# Patient Record
Sex: FEMALE | Race: WHITE | NOT HISPANIC OR LATINO | Employment: FULL TIME | URBAN - METROPOLITAN AREA
[De-identification: names, ages, dates, MRNs, and addresses within clinical notes are randomized per-mention and may not be internally consistent; named-entity substitution may affect disease eponyms.]

---

## 2017-01-04 ENCOUNTER — OFFICE VISIT (OUTPATIENT)
Dept: NEUROSURGERY | Facility: CLINIC | Age: 62
End: 2017-01-04

## 2017-01-04 VITALS
SYSTOLIC BLOOD PRESSURE: 143 MMHG | HEIGHT: 67 IN | BODY MASS INDEX: 23.54 KG/M2 | HEART RATE: 90 BPM | DIASTOLIC BLOOD PRESSURE: 91 MMHG | RESPIRATION RATE: 20 BRPM | WEIGHT: 150 LBS | OXYGEN SATURATION: 97 %

## 2017-01-04 DIAGNOSIS — Z98.890 POST-OPERATIVE STATE: ICD-10-CM

## 2017-01-04 DIAGNOSIS — M54.12 CERVICAL RADICULOPATHY: Primary | ICD-10-CM

## 2017-01-04 DIAGNOSIS — G44.221 CHRONIC TENSION-TYPE HEADACHE, INTRACTABLE: ICD-10-CM

## 2017-01-04 ASSESSMENT — PAIN SCALES - GENERAL: PAINLEVEL: SEVERE PAIN (6)
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
## 2017-01-04 NOTE — PATIENT INSTRUCTIONS
Call [phone] to schedule with Mercedes Fisher NP for your headaches    Call [facility] for you physical therapy consultation    If you have concerns, please call SANDHYA Damon at [phone]

## 2017-01-04 NOTE — MR AVS SNAPSHOT
After Visit Summary   1/4/2017    Kenya Jones    MRN: [id number]           Patient Information     Date Of Birth          1955        Visit Information        Provider Department      1/4/2017 2:00 PM Neda Perez PA-C [facility] Neurosurgery        Today's Diagnoses     Cervical radiculopathy    -  1     Post-operative state         Chronic headache           Care Instructions    Call [phone] to schedule with Mercedes Fisher NP for your headaches    Call [facility] for you physical therapy consultation    If you have concerns, please call SANDHYA Damon at [phone]          Follow-ups after your visit        Additional Services     Neurology Referral [2615]       Your provider has referred you to: [facility]: Neurology Clinic [facility] [phone]   [email or web address]  Mercedes Fisher NP for headaches    Reason for Referral: Consult    Please be aware that coverage of these services is subject to the terms and limitations of your health insurance plan.  Call member services at your health plan with any benefit or coverage questions.      Please bring the following with you to your appointment:    (1) Any X-Rays, CTs or MRIs which have been performed.  Contact the facility where they were done to arrange for  prior to your scheduled appointment.    (2) List of current medications  (3) This referral request   (4) Any documents/labs given to you for this referral            PT Evaluation and Treatment (External Referral) [1143]       Your provider has referred you to: [facility], [facility] [address] [facility] [address], [place] [address]  Phone: [phone]      Please be aware that coverage of these services is subject to the terms and limitations of your health insurance plan.  Call member services at your health plan with any benefit or coverage questions.      Treatment: Evaluation & Treatment  Special Instructions: None  Special  Programs: None  Modalities: As indicated  Equipment: None  Duration and Frequency: 3 of Visits - to teach home exercise program    Please bring the following to your appointment:  >>   Any x-rays, CTs or MRIs which have been performed.  Contact the facility where they were done to arrange for  prior to your scheduled appointment.  Any new CT, MRI or other procedures ordered by your specialist must be performed at a [place] facility or coordinated by your clinic's referral office.    >>   List of current medications   >>   This referral request   >>   Any documents/labs given to you for this referral      **Note to Provider** To refer patients to therapy outside of the Location list, change the order class to External Referral in the General section.                  Follow-up notes from your care team     Return in about 6 weeks (around 2/15/2017).      Your next 10 appointments already scheduled     Feb 03, 2017  9:30 AM   (Arrive by 9:15 AM)   New Patient Visit with AMELIA Duran CNP   [facility] Neurology ([facility])    [address] [place] [id number] [phone]            Feb 23, 2017  2:30 PM   (Arrive by 2:15 PM)   Return Visit with Ligia Winchesetr MD   [facility] Neurosurgery ([facility])    [address] [place] [id number] [phone]              Future tests that were ordered for you today     Open Future Orders        Priority Expected Expires Ordered    X-ray Cervical spine 2-3 vws Routine 2/13/2017 1/4/2018 1/4/2017            Who to contact     Please call your clinic at [phone] to:    Ask questions about your health    Make or cancel appointments    Discuss your medicines    Learn about your test results    Speak to your doctor   If you have compliments or concerns about an experience at your clinic, or if you wish to file a complaint, please contact  "[facility] Patient Relations at [phone] or email us at [email or web address]         Additional Information About Your Visit        [employer] Information     [employer] gives you secure access to your electronic health record. If you see a primary care provider, you can also send messages to your care team and make appointments. If you have questions, please call your primary care clinic.  If you do not have a primary care provider, please call [phone] and they will assist you.      [employer] is an electronic gateway that provides easy, online access to your medical records. With [employer], you can request a clinic appointment, read your test results, renew a prescription or communicate with your care team.     To access your existing account, please contact your [facility] Clinic or call [phone] for assistance.        Care EveryWhere ID     This is your Care EveryWhere ID. This could be used by other organizations to access your [facility] medical records  [id number]        Your Vitals Were     Pulse Respirations Height BMI (Body Mass Index) Pulse Oximetry Last Period    90 20 1.689 m (5' 6.5\") 23.85 kg/m2 97% 06/19/2006    Breastfeeding?                   No            Blood Pressure from Last 3 Encounters:   01/04/17 143/91   12/19/16 126/76   12/14/16 154/94    Weight from Last 3 Encounters:   01/04/17 68.04 kg (150 lb)   12/19/16 66.407 kg (146 lb 6.4 oz)   12/14/16 65.772 kg (145 lb)              We Performed the Following     Neurology Referral [9019]     PT Evaluation and Treatment (External Referral) [3732]          Today's Medication Changes          These changes are accurate as of: 1/4/17  3:10 PM.  If you have any questions, ask your nurse or doctor.               These medicines have changed or have updated prescriptions.        Dose/Directions    LEVOTHYROXINE SODIUM PO   Indication:  Underactive Thyroid   This may have changed:  " Another medication with the same name was removed. Continue taking this medication, and follow the directions you see here.   Changed by:  Neda Perez PA-C        Dose:  75 mcg   Take 75 mcg by mouth daily   Refills:  0       zolpidem 10 MG tablet   Commonly known as:  AMBIEN   This may have changed:    - how much to take  - how to take this  - when to take this  - reasons to take this  - additional instructions   Used for:  Insomnia, unspecified type        Patient has tried 5 mg dose and failed   Quantity:  39 tablet   Refills:  3                Primary Care Provider Office Phone # Fax #    Nahed Joe Rodas [phone]       [facility] [address] [place] [address]        Thank you!     Thank you for choosing [facility]  for your care. Our goal is always to provide you with excellent care. Hearing back from our patients is one way we can continue to improve our services. Please take a few minutes to complete the written survey that you may receive in the mail after your visit with us. Thank you!             Your Updated Medication List - Protect others around you: Learn how to safely use, store and throw away your medicines at www.disposemymeds.org.          This list is accurate as of: 1/4/17  3:10 PM.  Always use your most recent med list.                   Brand Name Dispense Instructions for use    AMITIZA PO      Take by mouth every 48 hours       benzocaine-menthol 6-10 MG lozenge    CHLORASEPTIC    84 lozenge    Place 1-2 lozenges inside cheek every hour as needed for sore throat       calcium-vitamin D-vitamin k 500-100-40 chewable tablet    VIACTIV    100 tablet    Take 1 chew tab by mouth 2 times daily.       cyclobenzaprine 10 MG tablet    FLEXERIL    90 tablet    Take 1 tablet (10 mg) by mouth 3 times daily as needed for muscle spasms       doxycycline monohydrate 25 MG/5ML Susr    VIBRAMYCIN     Take 100 mg by mouth 2 times daily        fluticasone 50 MCG/ACT spray    FLONASE    3 Bottle    Spray 1-2 sprays into both nostrils daily       HYDROcodone-acetaminophen 5-325 MG per tablet    NORCO    100 tablet    Take 1-2 tablets by mouth every 4 hours as needed for moderate to severe pain       hydrocortisone 25 MG Suppository    ANUSOL-HC    28 suppository    Place 1 suppository (25 mg) rectally 2 times daily as needed every morning and bedtime.       LEVOTHYROXINE SODIUM PO      Take 75 mcg by mouth daily       LINZESS PO      Take by mouth every morning (before breakfast)       RABEprazole 20 MG EC tablet    ACIPHEX    180 tablet    Take 1 tablet (20 mg) by mouth 2 times daily       rizatriptan 10 MG ODT tab    MAXALT-MLT    24 tablet    Take 1 tablet (10 mg) by mouth at onset of headache for migraine MAY REPEAT IN 2 HOURS IF NEEDED       senna-docusate 8.6-50 MG per tablet    SENOKOT-S;PERICOLACE    100 tablet    Take 1-2 tablets by mouth 2 times daily       vitamin B complex with vitamin C Tabs tablet      Take 1 tablet by mouth daily       vitamin D 2000 UNITS tablet      Take 2,000 Units by mouth daily       zolpidem 10 MG tablet    AMBIEN    39 tablet    Patient has tried 5 mg dose and failed

## 2017-01-04 NOTE — NURSING NOTE
Chief Complaint   Patient presents with     RECHECK     UMP- CERVICAL DISKECTOMY, 6 WEEKS F/U
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
## 2017-01-17 ENCOUNTER — PRE VISIT (OUTPATIENT)
Dept: NEUROLOGY | Facility: CLINIC | Age: 62
End: 2017-01-17

## 2017-01-17 NOTE — TELEPHONE ENCOUNTER
1.  Date/reason for appt: 2/3/17 - migraines  2.  Referring provider: [facility] Neurosurg Neda Perez  3.  Call to patient (Yes / No - short description): no, recs/ img in epic  4.  Previous care at:   - [facility] Neurosurgery    - [facility] Neurology    - [facility] Neurology (scanned in [facility])   - [facility]

## 2017-02-23 ENCOUNTER — OFFICE VISIT (OUTPATIENT)
Dept: NEUROSURGERY | Facility: CLINIC | Age: 62
End: 2017-02-23

## 2017-02-23 VITALS — HEIGHT: 67 IN | HEART RATE: 82 BPM | DIASTOLIC BLOOD PRESSURE: 90 MMHG | SYSTOLIC BLOOD PRESSURE: 143 MMHG

## 2017-02-23 DIAGNOSIS — Z98.1 ARTHRODESIS STATUS: Primary | ICD-10-CM

## 2017-02-23 ASSESSMENT — PAIN SCALES - GENERAL: PAINLEVEL: MILD PAIN (3)

## 2017-02-23 NOTE — LETTER
2017       RE: Shirley Jones  [address]   [place] [address]     Dear Colleague,    Thank you for referring your patient, Shirley Jones, to the [facility] NEUROSURGERY at [facility]. Please see a copy of my visit note below.    Please see dictated note #[id number].    HISTORY OF PRESENT ILLNESS:  Ms. Jones is a 61-year-old female seen in Neurosurgery Clinic today for a 3-month followup of a C5-6 ACDF.  The operation was performed on 2016 by Dr. Sigala.  Ms. Jones reports that she has been overall doing very well in the postoperative period.  She has been working with PT, and this improved her neck mobility and reduce the amount of muscle spasm she has had since the operation.  Her walking is fine.  She has had no swallowing issues.  Overall, she is happy with the results and is optimistic for continued resolution of symptoms in the future.      PHYSICAL EXAMINATION:    GENERAL:  Middle-aged female in no acute distress sitting in the exam chair.   INCISION:  A well-healed right anterior neck incision.      IMAGING:  AP and lateral x-rays of the cervical spine cord to 2017 were reviewed.  They demonstrate postsurgical hardware is in place and intact with no evidence of defect.      ASSESSMENT:  Ms. Jones is a 61-year-old female 3 months out from C4-C5 ACDF.  Overall, she is doing well.  We discussed that she may resume limited use of NSAIDs at this time.  We would like to see her back in clinic in 9 months.      PLAN:  Follow up in 9 months with flex-ex films of the C-spine prior.      PALU SIGALA MD       As dictated by MARILIA SEGURA MD, PHD, PGY1 neurosurgery resident.       D: 2017 05:32   T: 2017 07:12   MT: PHYLLIS      Name:     SHIRLEY JONES   MRN:      [id number]        Account:      [id number]   :      1955           Service Date: 2017      Document: [id number]

## 2017-02-23 NOTE — MR AVS SNAPSHOT
After Visit Summary   2/23/2017    Kenya Jones    MRN: [id number]           Patient Information     Date Of Birth          1955        Visit Information        Provider Department      2/23/2017 2:30 PM Ligia Winchester MD [facility] Neurosurgery        Today's Diagnoses     Arthrodesis status    -  1       Follow-ups after your visit        Follow-up notes from your care team     Return in about 9 months (around 11/23/2017).      Your next 10 appointments already scheduled     Nov 30, 2017 12:45 PM CST   XR CERVICAL SPINE FLEX/EXT 2/3 VIEWS with UCXR1   [facility] ([facility] [place])    [address]  [address] [facility] [address] [phone]           Please bring a list of your current medicines to your exam. (Include vitamins, minerals and over-thecounter medicines.) Leave your valuables at home.  Tell your doctor if there is a chance you may be pregnant.  You do not need to do anything special for this exam.            Nov 30, 2017  1:00 PM CST   (Arrive by 12:45 PM)   Return Visit with Ligia Winchester MD   [facility] Neurosurgery ([facility] [place])    [address]  3rd Floor  [facility] [address] [phone]              Who to contact     Please call your clinic at [phone] to:    Ask questions about your health    Make or cancel appointments    Discuss your medicines    Learn about your test results    Speak to your doctor   If you have compliments or concerns about an experience at your clinic, or if you wish to file a complaint, please contact [facility] Patient Relations at [phone] or email us at [email or web address]         Additional Information About Your Visit        MyChart Information     [employer] gives you secure access to your electronic health record. If you see a primary care provider, you can also send messages to your care team and make  "appointments. If you have questions, please call your primary care clinic.  If you do not have a primary care provider, please call [phone] and they will assist you.      [facility] is an electronic gateway that provides easy, online access to your medical records. With [facility], you can request a clinic appointment, read your test results, renew a prescription or communicate with your care team.     To access your existing account, please contact your [facility] or call [phone] for assistance.        Care EveryWhere ID     This is your Care EveryWhere ID. This could be used by other organizations to access your [place] medical records  [id number]        Your Vitals Were     Pulse Height Last Period             82 1.689 m (5' 6.5\") 06/19/2006          Blood Pressure from Last 3 Encounters:   No data found for BP    Weight from Last 3 Encounters:   No data found for Wt                 Today's Medication Changes          These changes are accurate as of: 2/23/17 11:59 PM.  If you have any questions, ask your nurse or doctor.               These medicines have changed or have updated prescriptions.        Dose/Directions    zolpidem 10 MG tablet   Commonly known as:  AMBIEN   This may have changed:    - how much to take  - how to take this  - when to take this  - reasons to take this  - additional instructions   Used for:  Insomnia, unspecified type        Patient has tried 5 mg dose and failed   Quantity:  39 tablet   Refills:  3                Primary Care Provider Office Phone # Fax #    Nahed Rodas [phone]       [facility] [address] [place] [address]        Thank you!     Thank you for choosing [facility]  for your care. Our goal is always to provide you with excellent care. Hearing back from our patients is one way we can continue to improve our services. Please take a few minutes to complete the written survey " that you may receive in the mail after your visit with us. Thank you!             Your Updated Medication List - Protect others around you: Learn how to safely use, store and throw away your medicines at www.disposemymeds.org.          This list is accurate as of: 2/23/17 11:59 PM.  Always use your most recent med list.                   Brand Name Dispense Instructions for use    AMITIZA PO      Take by mouth every 48 hours       benzocaine-menthol 6-10 MG lozenge    CHLORASEPTIC    84 lozenge    Place 1-2 lozenges inside cheek every hour as needed for sore throat       calcium-vitamin D-vitamin k 500-100-40 chewable tablet    VIACTIV    100 tablet    Take 1 chew tab by mouth 2 times daily.       cyclobenzaprine 10 MG tablet    FLEXERIL    90 tablet    Take 1 tablet (10 mg) by mouth 3 times daily as needed for muscle spasms       doxycycline monohydrate 25 MG/5ML Susr    VIBRAMYCIN     Take 100 mg by mouth 2 times daily       fluticasone 50 MCG/ACT spray    FLONASE    3 Bottle    Spray 1-2 sprays into both nostrils daily       HYDROcodone-acetaminophen 5-325 MG per tablet    NORCO    100 tablet    Take 1-2 tablets by mouth every 4 hours as needed for moderate to severe pain       hydrocortisone 25 MG Suppository    ANUSOL-HC    28 suppository    Place 1 suppository (25 mg) rectally 2 times daily as needed every morning and bedtime.       LEVOTHYROXINE SODIUM PO      Take 75 mcg by mouth daily       LINZESS PO      Take by mouth every morning (before breakfast)       rizatriptan 10 MG ODT tab    MAXALT-MLT    24 tablet    Take 1 tablet (10 mg) by mouth at onset of headache for migraine MAY REPEAT IN 2 HOURS IF NEEDED       senna-docusate 8.6-50 MG per tablet    SENOKOT-S;PERICOLACE    100 tablet    Take 1-2 tablets by mouth 2 times daily       vitamin B complex with vitamin C Tabs tablet      Take 1 tablet by mouth daily       vitamin D 2000 UNITS tablet      Take 2,000 Units by mouth daily       zolpidem 10 MG  tablet    AMBIEN    39 tablet    Patient has tried 5 mg dose and failed

## 2017-02-24 NOTE — PROGRESS NOTES
HISTORY OF PRESENT ILLNESS:  Ms. Jones is a 61-year-old female seen in Neurosurgery Clinic today for a 3-month followup of a C5-6 ACDF.  The operation was performed on 2016 by Dr. Sigala.  Ms. Jones reports that she has been overall doing very well in the postoperative period.  She has been working with PT, and this improved her neck mobility and reduce the amount of muscle spasm she has had since the operation.  Her walking is fine.  She has had no swallowing issues.  Overall, she is happy with the results and is optimistic for continued resolution of symptoms in the future.      PHYSICAL EXAMINATION:    GENERAL:  Middle-aged female in no acute distress sitting in the exam chair.   INCISION:  A well-healed right anterior neck incision.      IMAGING:  AP and lateral x-rays of the cervical spine cord to 2017 were reviewed.  They demonstrate postsurgical hardware is in place and intact with no evidence of defect.      ASSESSMENT:  Ms. Jones is a 61-year-old female 3 months out from C4-C5 ACDF.  Overall, she is doing well.  We discussed that she may resume limited use of NSAIDs at this time.  We would like to see her back in clinic in 9 months.      PLAN:  Follow up in 9 months with flex-ex films of the C-spine prior.          Addendum:  I have seen this patient and agree with this note. SYLVIA SIGALA MD       As dictated by MARILIA SEGURA MD, PHD, PGY1 neurosurgery resident.             D: 2017 05:32   T: 2017 07:12   MT: PHYLLIS      Name:     SHIRLEY JONES   MRN:      [id number]        Account:      [id number]   :      1955           Service Date: 2017      Document: [id number]

## 2017-03-14 DIAGNOSIS — G47.00 INSOMNIA, UNSPECIFIED TYPE: ICD-10-CM

## 2017-03-14 RX ORDER — ZOLPIDEM TARTRATE 10 MG/1
TABLET ORAL
Qty: 39 TABLET | Refills: 3 | Status: SHIPPED | OUTPATIENT
Start: 2017-03-14 | End: 2018-12-12

## 2017-03-14 NOTE — TELEPHONE ENCOUNTER
Call from pt requesting a refill for Ambien. States she has run out and has not been sleeping.    Ambien      Last Written Prescription Date:  8/2/16  Last Fill Quantity: 39,   # refills: 3  Last Office Visit with [facility], [facility] or [facility] prescribing provider: 12/19/16  Future Office visit:       Routing refill request to provider for review/approval because:  Drug not on the [facility], [facility] or [facility] refill protocol or controlled substance

## 2017-05-01 DIAGNOSIS — M54.42 ACUTE BILATERAL LOW BACK PAIN WITH LEFT-SIDED SCIATICA: Primary | ICD-10-CM

## 2017-05-01 RX ORDER — CYCLOBENZAPRINE HCL 10 MG
10 TABLET ORAL 3 TIMES DAILY PRN
Qty: 90 TABLET | Refills: 8 | Status: SHIPPED | OUTPATIENT
Start: 2017-05-01 | End: 2019-04-05

## 2017-05-01 NOTE — TELEPHONE ENCOUNTER
cyclobenzaprine      Last Written Prescription Date:  11/20/15  Last Fill Quantity: 90,   # refills: 8  Last Office Visit with [facility], [facility] or [facility] prescribing provider: 11/20/15  Future Office visit:

## 2017-06-14 DIAGNOSIS — E03.9 HYPOTHYROIDISM, UNSPECIFIED TYPE: Primary | ICD-10-CM

## 2017-06-14 RX ORDER — LEVOTHYROXINE SODIUM 75 UG/1
75 TABLET ORAL DAILY
Qty: 90 TABLET | Refills: 3 | Status: SHIPPED | OUTPATIENT
Start: 2017-06-14 | End: 2020-12-14

## 2017-06-14 NOTE — TELEPHONE ENCOUNTER
Pt calls, requesting refill of levothyroxine. States Acosta Gillespie was previous prescriber, but MD has left [facility] and was told last that since she is stable she didn't need to continue to return. Verified dosing with pt.    Pt reports she sees Sergio and considers him her PCP.    Levothyroxine 75 mcg daily    Last Written Prescription Date: N/A  Last Quantity: N/A, # refills: N/A  Last Office Visit with [facility], [facility] or [facility] prescribing provider: 12/19/16        TSH   Date Value Ref Range Status   12/20/2016 3.38 0.40 - 4.00 mU/L Final     Routing refill request to provider for review/approval because:  Medication is reported/historical    Please advise, thanks.

## 2017-07-10 ENCOUNTER — TELEPHONE (OUTPATIENT)
Dept: OBGYN | Facility: CLINIC | Age: 62
End: 2017-07-10

## 2017-08-04 ENCOUNTER — OFFICE VISIT (OUTPATIENT)
Dept: INTERNAL MEDICINE | Facility: CLINIC | Age: 62
End: 2017-08-04
Payer: COMMERCIAL

## 2017-08-04 VITALS
SYSTOLIC BLOOD PRESSURE: 110 MMHG | OXYGEN SATURATION: 98 % | WEIGHT: 148.2 LBS | HEART RATE: 91 BPM | BODY MASS INDEX: 23.26 KG/M2 | TEMPERATURE: 98.4 F | HEIGHT: 67 IN | DIASTOLIC BLOOD PRESSURE: 62 MMHG

## 2017-08-04 DIAGNOSIS — H91.90 DECREASED HEARING, UNSPECIFIED LATERALITY: ICD-10-CM

## 2017-08-04 DIAGNOSIS — M25.50 ARTHRALGIA, UNSPECIFIED JOINT: ICD-10-CM

## 2017-08-04 DIAGNOSIS — E03.8 OTHER SPECIFIED HYPOTHYROIDISM: Primary | ICD-10-CM

## 2017-08-04 DIAGNOSIS — Z98.1 S/P SPINAL FUSION: ICD-10-CM

## 2017-08-04 DIAGNOSIS — G70.00 OCULAR MYASTHENIA GRAVIS (H): ICD-10-CM

## 2017-08-04 DIAGNOSIS — Z12.31 VISIT FOR SCREENING MAMMOGRAM: ICD-10-CM

## 2017-08-04 LAB
CRP SERPL-MCNC: <2.9 MG/L (ref 0–8)
ERYTHROCYTE [SEDIMENTATION RATE] IN BLOOD BY WESTERGREN METHOD: 8 MM/H (ref 0–30)

## 2017-08-04 PROCEDURE — 36415 COLL VENOUS BLD VENIPUNCTURE: CPT | Performed by: INTERNAL MEDICINE

## 2017-08-04 PROCEDURE — 86200 CCP ANTIBODY: CPT | Performed by: INTERNAL MEDICINE

## 2017-08-04 PROCEDURE — 84443 ASSAY THYROID STIM HORMONE: CPT | Performed by: INTERNAL MEDICINE

## 2017-08-04 PROCEDURE — 99214 OFFICE O/P EST MOD 30 MIN: CPT | Performed by: INTERNAL MEDICINE

## 2017-08-04 PROCEDURE — 85652 RBC SED RATE AUTOMATED: CPT | Performed by: INTERNAL MEDICINE

## 2017-08-04 PROCEDURE — 86431 RHEUMATOID FACTOR QUANT: CPT | Performed by: INTERNAL MEDICINE

## 2017-08-04 PROCEDURE — 86140 C-REACTIVE PROTEIN: CPT | Performed by: INTERNAL MEDICINE

## 2017-08-04 NOTE — PROGRESS NOTES
"  SUBJECTIVE:                                                    Kenya Jones is a 62 year old female who presents to clinic today for the following health issues:    Fatigue.  She felt fatigued in June, and then the first week in July she improved.  This occurs regularly for her.   She feels as if the humidity worsens her joint pain.   She is a teacher.    Right hip pain and 2 digits on hand with pain.  She has had some swelling of her hand joints.  Her mother had rheumatoid arthritis.     Hypothyroidism. Weight has been stable. Skin changes- more dry. Eyelashes have fallen out.     Migraines.  She had a cervical spinal fusion in November, which has helped decrease her migraines.  She has had 2 since November.     Insomnia.  She uses ambien for sleep.     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    Reviewed and updated as needed this visit by clinical staff  Tobacco  Allergies  Meds  Med Hx  Surg Hx  Fam Hx  Soc Hx      Reviewed and updated as needed this visit by Provider         ROS:  C: NEGATIVE for fever, chills, change in weight  ENT: decreased hearing  R: NEGATIVE for significant cough or SOB  CV: NEGATIVE for chest pain, palpitations or peripheral edema    OBJECTIVE:     /62 (BP Location: Left arm, Patient Position: Sitting, Cuff Size: Adult Regular)  Pulse 91  Temp 98.4  F (36.9  C) (Oral)  Ht 5' 6.5\" (1.689 m)  Wt 148 lb 3.2 oz (67.2 kg)  LMP 06/19/2006  SpO2 98%  Breastfeeding? No  BMI 23.56 kg/m2  Body mass index is 23.56 kg/(m^2).  GENERAL: healthy, alert and no distress  ENT: ear canals patent bilaterally; tympanic membrane pearly gray bilaterally; thyroid without thyromegaly appreciated bilaterally  RESP: lungs clear to auscultation - no rales, rhonchi or wheezes  CV: regular rate and rhythm, normal S1 S2, no S3 or S4, no murmur, click or rub, no peripheral edema and peripheral pulses strong  MSK: " mild synovitis of hand joints appreciated bilaterally        ASSESSMENT/PLAN:       (E03.8) Other specified hypothyroidism  (primary encounter diagnosis)  Comment:   Plan: TSH with free T4 reflex            (Z98.1) S/P spinal fusion  Comment:   Plan: has improved migraines    (M25.50) Arthralgia, unspecified joint  Comment:   Plan: TSH with free T4 reflex, CBC with platelets         differential, Cyclic Citrullinated Peptide         Antibody IgG, Rheumatoid factor, Erythrocyte         sedimentation rate auto, CRP, inflammation            (H91.90) Decreased hearing, unspecified laterality  Comment:   Plan: OTOLARYNGOLOGY REFERRAL            (Z12.31) Visit for screening mammogram  Comment:   Plan: *MA Screening Digital Bilateral                Nahed Rodas MD  [facility]

## 2017-08-04 NOTE — NURSING NOTE
"Chief Complaint   Patient presents with     Musculoskeletal Problem     Pt has being having joint pain and gets tired easily. Pain at Rt hip and wants thyroid checked       Initial /62 (BP Location: Left arm, Patient Position: Sitting, Cuff Size: Adult Regular)  Pulse 91  Temp 98.4  F (36.9  C) (Oral)  Ht 5' 6.5\" (1.689 m)  Wt 148 lb 3.2 oz (67.2 kg)  LMP 06/19/2006  SpO2 98%  Breastfeeding? No  BMI 23.56 kg/m2 Estimated body mass index is 23.56 kg/(m^2) as calculated from the following:    Height as of this encounter: 5' 6.5\" (1.689 m).    Weight as of this encounter: 148 lb 3.2 oz (67.2 kg).  Medication Reconciliation: complete   Parag Andersen MA    "

## 2017-08-05 LAB — TSH SERPL DL<=0.005 MIU/L-ACNC: 2.39 MU/L (ref 0.4–4)

## 2017-08-07 LAB
CCP AB SER IA-ACNC: 1 U/ML
RHEUMATOID FACT SER NEPH-ACNC: <20 IU/ML (ref 0–20)

## 2017-08-10 ENCOUNTER — MYC MEDICAL ADVICE (OUTPATIENT)
Dept: INTERNAL MEDICINE | Facility: CLINIC | Age: 62
End: 2017-08-10

## 2017-08-17 ENCOUNTER — TRANSFERRED RECORDS (OUTPATIENT)
Dept: HEALTH INFORMATION MANAGEMENT | Facility: CLINIC | Age: 62
End: 2017-08-17

## 2017-08-30 NOTE — TELEPHONE ENCOUNTER
Contacted pt for follow up. Pt states that she did receive the Mychart from our office, but is looking into options right now. Pt will call back with an update once she decides how she wants to proceed.

## 2017-09-22 ENCOUNTER — HOSPITAL ENCOUNTER (OUTPATIENT)
Dept: MAMMOGRAPHY | Facility: CLINIC | Age: 62
Discharge: HOME | End: 2017-09-22
Attending: OBSTETRICS & GYNECOLOGY | Admitting: OBSTETRICS & GYNECOLOGY
Payer: COMMERCIAL

## 2017-09-22 DIAGNOSIS — Z12.31 VISIT FOR SCREENING MAMMOGRAM: ICD-10-CM

## 2017-09-22 PROCEDURE — 77063 BREAST TOMOSYNTHESIS BI: CPT

## 2017-11-30 ENCOUNTER — OFFICE VISIT (OUTPATIENT)
Dept: NEUROSURGERY | Facility: CLINIC | Age: 62
End: 2017-11-30

## 2017-11-30 VITALS — HEIGHT: 67 IN | WEIGHT: 153 LBS | BODY MASS INDEX: 24.01 KG/M2

## 2017-11-30 DIAGNOSIS — M54.12 CERVICAL RADICULOPATHY: Primary | ICD-10-CM

## 2017-11-30 ASSESSMENT — PAIN SCALES - GENERAL: PAINLEVEL: MILD PAIN (2)

## 2017-11-30 NOTE — MR AVS SNAPSHOT
"              After Visit Summary   11/30/2017    Kenya Jones    MRN: [id number]           Patient Information     Date Of Birth          1955        Visit Information        Provider Department      11/30/2017 1:00 PM Ligia Winchester MD [facility] Neurosurgery        Today's Diagnoses     Cervical radiculopathy    -  1       Follow-ups after your visit        Who to contact     Please call your clinic at [phone] to:    Ask questions about your health    Make or cancel appointments    Discuss your medicines    Learn about your test results    Speak to your doctor   If you have compliments or concerns about an experience at your clinic, or if you wish to file a complaint, please contact [facility] Patient Relations at [phone] or email us at [email or web address]         Additional Information About Your Visit        MyChart Information     [employer] gives you secure access to your electronic health record. If you see a primary care provider, you can also send messages to your care team and make appointments. If you have questions, please call your primary care clinic.  If you do not have a primary care provider, please call [phone] and they will assist you.      [employer] is an electronic gateway that provides easy, online access to your medical records. With [employer], you can request a clinic appointment, read your test results, renew a prescription or communicate with your care team.     To access your existing account, please contact your [facility] or call [phone] for assistance.        Care EveryWhere ID     This is your Care EveryWhere ID. This could be used by other organizations to access your [place] medical records  [id number]        Your Vitals Were     Height Last Period BMI (Body Mass Index)             1.689 m (5' 6.5\") 06/19/2006 24.32 kg/m2          Blood Pressure from Last 3 Encounters:   11/30/17 (P) " 124/86   08/04/17 110/62   02/23/17 143/90    Weight from Last 3 Encounters:   11/30/17 69.4 kg (153 lb)   08/04/17 67.2 kg (148 lb 3.2 oz)   01/04/17 68 kg (150 lb)              Today, you had the following     No orders found for display         Today's Medication Changes          These changes are accurate as of: 11/30/17 11:59 PM.  If you have any questions, ask your nurse or doctor.               These medicines have changed or have updated prescriptions.        Dose/Directions    zolpidem 10 MG tablet   Commonly known as:  AMBIEN   This may have changed:    - when to take this  - reasons to take this  - additional instructions   Used for:  Insomnia, unspecified type        Patient has tried 5 mg dose and failed   Quantity:  39 tablet   Refills:  3         Stop taking these medicines if you haven't already. Please contact your care team if you have questions.     calcium-vitamin D-vitamin k 500-100-40 chewable tablet   Commonly known as:  VIACTIV   Stopped by:  Ligia Winchester MD                    Primary Care Provider Office Phone # Fax #    [place] [phone]       [address] [address]        Equal Access to Services     [facility] Hospital[facility] : Hadii makenzie acosta hadkierano Soberna, waaxda luqadaha, qaybta kaalmada adeegyada, rita swenson. So [facility] [phone].    ATENCIÓN: Si habla español, tiene a mccullough disposición servicios gratuitos de asistencia lingüística. Llame al [phone].    We comply with applicable federal civil rights laws and [place] laws. We do not discriminate on the basis of race, color, national origin, age, disability, sex, sexual orientation, or gender identity.            Thank you!     Thank you for choosing [facility]  for your care. Our goal is always to provide you with excellent care. Hearing back from our patients is one way we can continue to improve our services. Please take a few minutes to  complete the written survey that you may receive in the mail after your visit with us. Thank you!             Your Updated Medication List - Protect others around you: Learn how to safely use, store and throw away your medicines at www.disposemymeds.org.          This list is accurate as of: 11/30/17 11:59 PM.  Always use your most recent med list.                   Brand Name Dispense Instructions for use Diagnosis    AMITIZA PO      Take by mouth every 48 hours        cyclobenzaprine 10 MG tablet    FLEXERIL    90 tablet    Take 1 tablet (10 mg) by mouth 3 times daily as needed for muscle spasms    Acute bilateral low back pain with left-sided sciatica       fluticasone 50 MCG/ACT spray    FLONASE    3 Bottle    Spray 1-2 sprays into both nostrils daily    Seasonal allergic rhinitis due to pollen       hydrocortisone 25 MG Suppository    ANUSOL-HC    28 suppository    Place 1 suppository (25 mg) rectally 2 times daily as needed every morning and bedtime.    External hemorrhoids       levothyroxine 75 MCG tablet    SYNTHROID/LEVOTHROID    90 tablet    Take 1 tablet (75 mcg) by mouth daily    Hypothyroidism, unspecified type       LINZESS PO      Take 290 mcg by mouth every morning (before breakfast)        vitamin B complex with vitamin C Tabs tablet      Take 1 tablet by mouth daily        vitamin D 2000 UNITS tablet      Take 1,000 Units by mouth daily        zolpidem 10 MG tablet    AMBIEN    39 tablet    Patient has tried 5 mg dose and failed    Insomnia, unspecified type

## 2017-11-30 NOTE — LETTER
11/30/2017       RE: Kenya Jones  [address] [place] [address]     Dear Colleague,    Thank you for referring your patient, Kenya Jones, to the [facility] NEUROSURGERY at [facility]. Please see a copy of my visit note below.    HISTORY OF PRESENT ILLNESS:  Ms. Jones is a 62-year-old female returning to Neurosurgery Clinic for her 1-year followup status post anterior C4-C5 diskectomy and fusion completed on 11/29/2016.  Postoperatively, the patient has continued to do very well and has had resolution of her radicular pains.  She does complain, however, of a new and worsening pain radiating down her right leg.  She describes pain in a right L5 radicular distribution.  This pain is worse at night.  She has attempted physical therapy for approximately the last 6 months but feels that the pain has continued to get worse.  She is continuing to want to treat this conservatively.  She denies any numbness or weakness.      PHYSICAL EXAMINATION:  The patient is alert and appropriately communicative.  Sensation and strength are intact in upper and lower extremities bilaterally.  The patient's incision is well-healed.      IMAGING:  The cervical flexion and extension x-rays dated 11/30/2017 were available for review.  These images demonstrate no movement across the fusion levels.      ASSESSMENT:     1.  Cervical radiculopathy, status post anterior C4-C5 diskectomy and fusion.     2.  Right L5 radiculopathy.      PLAN:   1.  The patient is discharged from clinic.   2.  The patient to follow up and work with her PCP regarding her right leg pain.  Additional imaging should be ordered by her PCP to complete the workup.      The patient is welcome to follow up in clinic p.r.n. for her ongoing lower extremity pain if the additional imaging demonstrates an appropriate surgical target.       Dictated by Tanner Burns MD, Resident       Addendum:  I have seen this patient  and agree with this note. AMP    PAUL WINCHESTER MD       As dictated by PEARL REHMAN MD            D: 2017 05:17   T: 2017 09:44   MT: PHYLLIS      Name:     SHIRLEY LÓPEZ   MRN:      [id number]        Account:      [id number]   :      1955           Service Date: 2017      Document: [id number]        Again, thank you for allowing me to participate in the care of your patient.      Sincerely,    Paul Winchester MD

## 2017-11-30 NOTE — NURSING NOTE
Chief Complaint   Patient presents with     RECHECK     F/U ARTHRODESIS STATUS      Heather Linares, [facility]  Endocrinology & Diabetes 3G

## 2017-12-06 NOTE — PROGRESS NOTES
HISTORY OF PRESENT ILLNESS:  Ms. Jones is a 62-year-old female returning to [facility] for her 1-year followup status post anterior C4-C5 diskectomy and fusion completed on 2016.  Postoperatively, the patient has continued to do very well and has had resolution of her radicular pains.  She does complain, however, of a new and worsening pain radiating down her right leg.  She describes pain in a right L5 radicular distribution.  This pain is worse at night.  She has attempted physical therapy for approximately the last 6 months but feels that the pain has continued to get worse.  She is continuing to want to treat this conservatively.  She denies any numbness or weakness.      PHYSICAL EXAMINATION:  The patient is alert and appropriately communicative.  Sensation and strength are intact in upper and lower extremities bilaterally.  The patient's incision is well-healed.      IMAGING:  The cervical flexion and extension x-rays dated 2017 were available for review.  These images demonstrate no movement across the fusion levels.      ASSESSMENT:     1.  Cervical radiculopathy, status post anterior C4-C5 diskectomy and fusion.     2.  Right L5 radiculopathy.      PLAN:   1.  The patient is discharged from clinic.   2.  The patient to follow up and work with her PCP regarding her right leg pain.  Additional imaging should be ordered by her PCP to complete the workup.      The patient is welcome to follow up in clinic p.r.n. for her ongoing lower extremity pain if the additional imaging demonstrates an appropriate surgical target.       Dictated by Pearl Burns MD, Resident       Addendum:  I have seen this patient and agree with this note. SYLVIA SIGALA MD       As dictated by PEARL BURNS MD            D: 2017 05:17   T: 2017 09:44   MT: PHYLLIS      Name:     SHIRLEY JONES   MRN:      [id number]        Account:      [id number]   :      1955           Service  Date: 11/30/2017      Document: [id number]

## 2018-01-20 ENCOUNTER — HEALTH MAINTENANCE LETTER (OUTPATIENT)
Age: 63
End: 2018-01-20

## 2018-01-29 ENCOUNTER — OFFICE VISIT (OUTPATIENT)
Dept: OBGYN | Facility: CLINIC | Age: 63
End: 2018-01-29
Payer: COMMERCIAL

## 2018-01-29 VITALS
DIASTOLIC BLOOD PRESSURE: 80 MMHG | WEIGHT: 150 LBS | SYSTOLIC BLOOD PRESSURE: 114 MMHG | BODY MASS INDEX: 23.85 KG/M2 | HEART RATE: 68 BPM

## 2018-01-29 DIAGNOSIS — Z01.419 ENCOUNTER FOR GYNECOLOGICAL EXAMINATION WITHOUT ABNORMAL FINDING: Primary | ICD-10-CM

## 2018-01-29 DIAGNOSIS — R10.2 PELVIC PAIN IN FEMALE: ICD-10-CM

## 2018-01-29 PROCEDURE — 87624 HPV HI-RISK TYP POOLED RSLT: CPT | Performed by: OBSTETRICS & GYNECOLOGY

## 2018-01-29 PROCEDURE — 88175 CYTOPATH C/V AUTO FLUID REDO: CPT | Performed by: OBSTETRICS & GYNECOLOGY

## 2018-01-29 PROCEDURE — 99396 PREV VISIT EST AGE 40-64: CPT | Performed by: OBSTETRICS & GYNECOLOGY

## 2018-01-29 NOTE — MR AVS SNAPSHOT
After Visit Summary   1/29/2018    Kenya Jones    MRN: [id number]           Patient Information     Date Of Birth          1955        Visit Information        Provider Department      1/29/2018 5:00 PM Duglas Hill MD [facility]        Today's Diagnoses     Encounter for gynecological examination without abnormal finding    -  1    Pelvic pain in female           Follow-ups after your visit        Your next 10 appointments already scheduled     Feb 23, 2018  1:15 PM CST   US PELVIC COMPLETE W TRANSVAGINAL with RIUS1   [facility] ([facility])    [address]  [address]  [facility] [address] [phone]           Please bring a list of your medicines (including vitamins, minerals and over-the-counter drugs). Also, tell your doctor about any allergies you may have. Wear comfortable clothes and leave your valuables at home.  Adults: Drink six 8-ounce glasses of fluid one hour before your exam. Do NOT empty your bladder.  If you need to empty your bladder before your exam, try to release only a little bit of urine. Then, drink another 8oz glass of fluid.  Children: Children who are potty trained should drink at least 4 cups (32 oz) of liquid 45 minutes to one hour prior to the exam. The child s bladder must be full in order to achieve a diagnostic exam. If your child is very uncomfortable or has an urgent need to pee, please notify a technologist; they will try to find out how much longer the wait may be and provide instructions to help relieve the pressure. Occasionally it is medically necessary to insert a urinary catheter to fill the bladder.  Please call the Imaging Department at your exam site with any questions.              Future tests that were ordered for you today     Open Future Orders        Priority Expected Expires Ordered    US Pelvic Complete w Transvaginal Routine  1/29/2019 1/29/2018            Who  to contact     If you have questions or need follow up information about today's clinic visit or your schedule please contact [facility] directly at [phone].  Normal or non-critical lab and imaging results will be communicated to you by Exhibiahart, letter or phone within 4 business days after the clinic has received the results. If you do not hear from us within 7 days, please contact the clinic through Exhibiahart or phone. If you have a critical or abnormal lab result, we will notify you by phone as soon as possible.  Submit refill requests through Flyzik or call your pharmacy and they will forward the refill request to us. Please allow 3 business days for your refill to be completed.          Additional Information About Your Visit        ExhibiaharWho is Undercover Spy Information     Flyzik gives you secure access to your electronic health record. If you see a primary care provider, you can also send messages to your care team and make appointments. If you have questions, please call your primary care clinic.  If you do not have a primary care provider, please call [phone] and they will assist you.        Care EveryWhere ID     This is your Care EveryWhere ID. This could be used by other organizations to access your [place] medical records  [id number]        Your Vitals Were     Pulse Last Period BMI (Body Mass Index)             68 06/23/2006 23.85 kg/m2          Blood Pressure from Last 3 Encounters:   01/29/18 114/80   11/30/17 (P) 124/86   08/04/17 110/62    Weight from Last 3 Encounters:   01/29/18 150 lb (68 kg)   11/30/17 153 lb (69.4 kg)   08/04/17 148 lb 3.2 oz (67.2 kg)              We Performed the Following     HPV High Risk Types DNA Cervical     Pap imaged thin layer diagnostic with HPV (select HPV order below)          Today's Medication Changes          These changes are accurate as of 1/29/18  6:05 PM.  If you have any questions, ask your nurse or doctor.               These medicines have  changed or have updated prescriptions.        Dose/Directions    zolpidem 10 MG tablet   Commonly known as:  AMBIEN   This may have changed:    - when to take this  - reasons to take this  - additional instructions   Used for:  Insomnia, unspecified type        Patient has tried 5 mg dose and failed   Quantity:  39 tablet   Refills:  3                Primary Care Provider Office Phone # Fax #    Duglas Hill [phone]       [address] [facility]  [address]  [facility] [address]        Equal Access to Services     ADRIANA ROPER : Hadii aad ku hadasho Soomaali, waaxda luqadaha, qaybta kaalmada adeegyada, waxay idiin hayaan adeeg kharadeclan laso . So [facility] [phone].    ATENCIÓN: Si habla español, tiene a mccullough disposición servicios gratuitos de asistencia lingüística. [facility] [phone].    We comply with applicable federal civil rights laws and [place] laws. We do not discriminate on the basis of race, color, national origin, age, disability, sex, sexual orientation, or gender identity.            Thank you!     Thank you for choosing [facility]  for your care. Our goal is always to provide you with excellent care. Hearing back from our patients is one way we can continue to improve our services. Please take a few minutes to complete the written survey that you may receive in the mail after your visit with us. Thank you!             Your Updated Medication List - Protect others around you: Learn how to safely use, store and throw away your medicines at www.disposemymeds.org.          This list is accurate as of 1/29/18  6:05 PM.  Always use your most recent med list.                   Brand Name Dispense Instructions for use Diagnosis    AMITIZA PO      Take by mouth every 48 hours        cyclobenzaprine 10 MG tablet    FLEXERIL    90 tablet    Take 1 tablet (10 mg) by mouth 3 times daily as needed for muscle spasms    Acute bilateral low back pain with left-sided sciatica        fluticasone 50 MCG/ACT spray    FLONASE    3 Bottle    Spray 1-2 sprays into both nostrils daily    Seasonal allergic rhinitis due to pollen       hydrocortisone 25 MG Suppository    ANUSOL-HC    28 suppository    Place 1 suppository (25 mg) rectally 2 times daily as needed every morning and bedtime.    External hemorrhoids       levothyroxine 75 MCG tablet    SYNTHROID/LEVOTHROID    90 tablet    Take 1 tablet (75 mcg) by mouth daily    Hypothyroidism, unspecified type       LINZESS PO      Take 290 mcg by mouth every morning (before breakfast)        vitamin B complex with vitamin C Tabs tablet      Take 1 tablet by mouth daily        vitamin D 2000 UNITS tablet      Take 1,000 Units by mouth daily        zolpidem 10 MG tablet    AMBIEN    39 tablet    Patient has tried 5 mg dose and failed    Insomnia, unspecified type

## 2018-01-29 NOTE — NURSING NOTE
HEALTH CARE MAINTENANCE:  ========================  Ever had an abnormal pap? Yes - date:   Menses are every NA days; lasting for NA days.  Flow is   NA.  Menstrual Pain? NA PMS symptoms? NA  Have you had a pneumonia shot? Not applicable  How many dairy products do you eat daily? 2  Have you had an eye exam in the past year? YES  Health Maintenance Reviewed:  Health Maintenance   Topic Date Due     ADVANCE DIRECTIVE PLANNING Q5 YRS  05/06/2012     INFLUENZA VACCINE (SYSTEM ASSIGNED)  09/01/2017     PAP Q1 YR DIAGNOSTIC  12/19/2017     HPV Q1 Year  12/19/2017     MAMMO Q1 YR  09/22/2018     TETANUS Q10 YR  12/01/2020     LIPID SCREEN Q5 YR FEMALE (SYSTEM ASSIGNED)  12/20/2021     COLONOSCOPY Q10 YR  08/17/2027     MIGRAINE ACTION PLAN  Completed     HEPATITIS C SCREENING  Completed       SAFETY:  =======  Do you exercise? YES       If yes, how many times per week? 4-5  Do you feel safe in your relationship(s)? Not Applicable  Do you have a gun in your home? N/A   Do you wear your seatbelt regularly? YES  Do you use sunscreen? NO    Are you fasting today? No    Last mammogram 09/22/17    Last pap 12/19/16    Skin check - spot on back    Nurse assisted visit.  Tammy Aaron MA.
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
## 2018-02-02 LAB
COPATH REPORT: NORMAL
PAP: NORMAL

## 2018-02-06 LAB
FINAL DIAGNOSIS: ABNORMAL
HPV HR 12 DNA CVX QL NAA+PROBE: POSITIVE
HPV16 DNA SPEC QL NAA+PROBE: NEGATIVE
HPV18 DNA SPEC QL NAA+PROBE: NEGATIVE
SPECIMEN DESCRIPTION: ABNORMAL
SPECIMEN SOURCE CVX/VAG CYTO: ABNORMAL

## 2018-02-23 ENCOUNTER — RADIANT APPOINTMENT (OUTPATIENT)
Dept: ULTRASOUND IMAGING | Facility: CLINIC | Age: 63
End: 2018-02-23
Attending: OBSTETRICS & GYNECOLOGY
Payer: COMMERCIAL

## 2018-02-23 DIAGNOSIS — R10.2 PELVIC PAIN IN FEMALE: ICD-10-CM

## 2018-02-23 PROCEDURE — 76830 TRANSVAGINAL US NON-OB: CPT | Performed by: OBSTETRICS & GYNECOLOGY

## 2018-02-23 PROCEDURE — 76856 US EXAM PELVIC COMPLETE: CPT | Performed by: OBSTETRICS & GYNECOLOGY

## 2018-03-28 DIAGNOSIS — J30.2 SEASONAL ALLERGIC RHINITIS: Primary | ICD-10-CM

## 2018-03-28 RX ORDER — FLUTICASONE PROPIONATE 50 MCG
1-2 SPRAY, SUSPENSION (ML) NASAL DAILY
Qty: 3 BOTTLE | Refills: 3 | Status: SHIPPED | OUTPATIENT
Start: 2018-03-28 | End: 2020-12-14

## 2018-03-28 NOTE — TELEPHONE ENCOUNTER
Fluticasone propionate      Last Written Prescription Date:  12/29/16  Last Fill Quantity: 3,   # refills: 3  Last Office Visit: 1/29/18  Future Office visit:

## 2018-03-28 NOTE — TELEPHONE ENCOUNTER
Prescription approved per [facility] Refill Protocol.  Harriet Mccarthy RN

## 2018-09-26 ENCOUNTER — HOSPITAL ENCOUNTER (OUTPATIENT)
Dept: MAMMOGRAPHY | Facility: CLINIC | Age: 63
Discharge: HOME | End: 2018-09-26
Attending: OBSTETRICS & GYNECOLOGY | Admitting: OBSTETRICS & GYNECOLOGY
Payer: COMMERCIAL

## 2018-09-26 DIAGNOSIS — Z12.39 BREAST CANCER SCREENING: ICD-10-CM

## 2018-09-26 PROCEDURE — 77063 BREAST TOMOSYNTHESIS BI: CPT

## 2018-11-07 ENCOUNTER — OFFICE VISIT (OUTPATIENT)
Dept: OBGYN | Facility: CLINIC | Age: 63
End: 2018-11-07
Payer: COMMERCIAL

## 2018-11-07 VITALS
DIASTOLIC BLOOD PRESSURE: 86 MMHG | SYSTOLIC BLOOD PRESSURE: 142 MMHG | HEART RATE: 68 BPM | WEIGHT: 149 LBS | BODY MASS INDEX: 23.69 KG/M2

## 2018-11-07 DIAGNOSIS — B97.7 HIGH RISK HPV INFECTION: Primary | ICD-10-CM

## 2018-11-07 PROCEDURE — 87624 HPV HI-RISK TYP POOLED RSLT: CPT | Performed by: OBSTETRICS & GYNECOLOGY

## 2018-11-07 PROCEDURE — 88175 CYTOPATH C/V AUTO FLUID REDO: CPT | Performed by: OBSTETRICS & GYNECOLOGY

## 2018-11-07 PROCEDURE — 57452 EXAM OF CERVIX W/SCOPE: CPT | Performed by: OBSTETRICS & GYNECOLOGY

## 2018-11-07 PROCEDURE — 88141 CYTOPATH C/V INTERPRET: CPT | Performed by: OBSTETRICS & GYNECOLOGY

## 2018-11-07 RX ORDER — FLUTICASONE PROPIONATE 50 MCG
SPRAY, SUSPENSION (ML) NASAL
COMMUNITY
End: 2024-06-03

## 2018-11-07 RX ORDER — LEVOTHYROXINE SODIUM 75 UG/1
75 TABLET ORAL
COMMUNITY
Start: 2018-06-29 | End: 2019-06-29

## 2018-11-07 RX ORDER — CHOLECALCIFEROL (VITAMIN D3) 50 MCG
1000 TABLET ORAL
COMMUNITY
Start: 2014-09-12

## 2018-11-07 RX ORDER — ZOLMITRIPTAN 5 MG/1
5 SPRAY NASAL
COMMUNITY
Start: 2017-09-22 | End: 2020-12-14

## 2018-11-07 RX ORDER — ZOLPIDEM TARTRATE 10 MG/1
10 TABLET ORAL
COMMUNITY
Start: 2018-07-12 | End: 2020-12-14

## 2018-11-07 RX ORDER — CYCLOBENZAPRINE HCL 10 MG
10 TABLET ORAL
COMMUNITY
Start: 2018-01-25

## 2018-11-07 RX ORDER — ZOLPIDEM TARTRATE 10 MG/1
10 TABLET ORAL
COMMUNITY
Start: 2018-01-25 | End: 2020-12-14

## 2018-11-07 NOTE — LETTER
November 14, 2018      Kenya Jones  [address] [place] [address]    Dear ,      This letter is in regards to your recent cervical cancer screening (PAP smear and HPV test) that was completed along with your colpsocopy.    Your Pap smear result was reported as LSIL - low grade squamous intraepthelial lesion This means that there were mildly abnormal cells found in the sample that we collected from your cervix. The vast majority of patients with this result do not have significant cervical abnormalities.     Your cervical sample was also tested for the presence of Human Papillomavirus (HPV). Your sample was positive for HPV. There are many types of HPV, but we test pap samples for the high risk types. HPV can be the cause of an abnormal Pap smear result. The high risk types of HPV can also be related to the potential development of cervical cancer if not monitored and/or treated appropriately.    Over time, your body can get rid of these abnormal cells, so Dr. Hill has recommended that you repeat your PAP smear in 6 months to 1 year.    If you have additional questions regarding this result, please call our registered nurse, Bina at [phone].    Please continue to be seen every year for an annual physical exam and other preventative tests.    Sincerely,      Duglas Hill MD/Lynette Nissen RN

## 2018-11-07 NOTE — MR AVS SNAPSHOT
After Visit Summary   11/7/2018    Kenya Jones    MRN: [id number]           Patient Information     Date Of Birth          1955        Visit Information        Provider Department      11/7/2018 5:30 PM [facility] MD [facility]        Today's Diagnoses     High risk HPV infection    -  1       Follow-ups after your visit        Who to contact     If you have questions or need follow up information about today's clinic visit or your schedule please contact [facility] directly at [phone].  Normal or non-critical lab and imaging results will be communicated to you by Contorionhart, letter or phone within 4 business days after the clinic has received the results. If you do not hear from us within 7 days, please contact the clinic through [employer] or phone. If you have a critical or abnormal lab result, we will notify you by phone as soon as possible.  Submit refill requests through SigFig or call your pharmacy and they will forward the refill request to us. Please allow 3 business days for your refill to be completed.          Additional Information About Your Visit        MyChart Information     SigFig gives you secure access to your electronic health record. If you see a primary care provider, you can also send messages to your care team and make appointments. If you have questions, please call your primary care clinic.  If you do not have a primary care provider, please call [phone] and they will assist you.        Care EveryWhere ID     This is your Care EveryWhere ID. This could be used by other organizations to access your [place] medical records  [id number]        Your Vitals Were     Pulse Last Period BMI (Body Mass Index)             68 06/23/2006 23.69 kg/m2          Blood Pressure from Last 3 Encounters:   11/07/18 142/86   01/29/18 114/80   11/30/17 (P) 124/86    Weight from Last 3 Encounters:   11/07/18 149 lb (67.6 kg)   01/29/18 150 lb (68  kg)   11/30/17 153 lb (69.4 kg)              We Performed the Following     HPV High Risk Types DNA Cervical     Pap imaged thin layer diagnostic with HPV (select HPV order below)          Today's Medication Changes          These changes are accurate as of 11/7/18  6:42 PM.  If you have any questions, ask your nurse or doctor.               These medicines have changed or have updated prescriptions.        Dose/Directions    * zolpidem 10 MG tablet   Commonly known as:  AMBIEN   This may have changed:    - when to take this  - reasons to take this  - additional instructions   Used for:  Insomnia, unspecified type        Patient has tried 5 mg dose and failed   Quantity:  39 tablet   Refills:  3       * zolpidem 10 MG tablet   Commonly known as:  AMBIEN   This may have changed:  Another medication with the same name was changed. Make sure you understand how and when to take each.        Dose:  10 mg   Take 10 mg by mouth   Refills:  0       * zolpidem 10 MG tablet   Commonly known as:  AMBIEN   This may have changed:  Another medication with the same name was changed. Make sure you understand how and when to take each.        Dose:  10 mg   Take 10 mg by mouth   Refills:  0       * Notice:  This list has 3 medication(s) that are the same as other medications prescribed for you. Read the directions carefully, and ask your doctor or other care provider to review them with you.             Primary Care Provider Office Phone # Fax #    Duglas Hill [phone]       [address] [address]        Equal Access to Services     [facility]: Hadii makenzie acosta hadasho Soberna, waaxda luqadaha, qaybta kaalmada adeegyada, rita swenson. So [facility] [phone].    ATENCIÓN: Si habla español, tiene a mccullough disposición servicios gratuitos de asistencia lingüística. Llame al [phone].    We comply with applicable federal civil rights laws and [place] laws. We  do not discriminate on the basis of race, color, national origin, age, disability, sex, sexual orientation, or gender identity.            Thank you!     Thank you for choosing [facility]  for your care. Our goal is always to provide you with excellent care. Hearing back from our patients is one way we can continue to improve our services. Please take a few minutes to complete the written survey that you may receive in the mail after your visit with us. Thank you!             Your Updated Medication List - Protect others around you: Learn how to safely use, store and throw away your medicines at www.disposemymeds.org.          This list is accurate as of 11/7/18  6:42 PM.  Always use your most recent med list.                   Brand Name Dispense Instructions for use Diagnosis    AMITIZA PO      Take by mouth every 48 hours        * cyclobenzaprine 10 MG tablet    FLEXERIL    90 tablet    Take 1 tablet (10 mg) by mouth 3 times daily as needed for muscle spasms    Acute bilateral low back pain with left-sided sciatica       * cyclobenzaprine 10 MG tablet    FLEXERIL     Take 10 mg by mouth        * fluticasone 50 MCG/ACT spray    FLONASE     Use two sprays in each nostril once daily.        * fluticasone 50 MCG/ACT spray    FLONASE    3 Bottle    Spray 1-2 sprays into both nostrils daily    Seasonal allergic rhinitis       hydrocortisone 25 MG Suppository    ANUSOL-HC    28 suppository    Place 1 suppository (25 mg) rectally 2 times daily as needed every morning and bedtime.    External hemorrhoids       * levothyroxine 75 MCG tablet    SYNTHROID/LEVOTHROID    90 tablet    Take 1 tablet (75 mcg) by mouth daily    Hypothyroidism, unspecified type       * levothyroxine 75 MCG tablet    SYNTHROID/LEVOTHROID     Take 75 mcg by mouth        LINZESS PO      Take 290 mcg by mouth every morning (before breakfast)        vitamin B complex with vitamin C Tabs tablet      Take 1 tablet by mouth daily        vitamin  D 2000 units tablet      Take 1,000 Units by mouth        ZOLMitriptan 5 MG Soln      Spray 5 mg in nostril        * zolpidem 10 MG tablet    AMBIEN    39 tablet    Patient has tried 5 mg dose and failed    Insomnia, unspecified type       * zolpidem 10 MG tablet    AMBIEN     Take 10 mg by mouth        * zolpidem 10 MG tablet    AMBIEN     Take 10 mg by mouth        * Notice:  This list has 9 medication(s) that are the same as other medications prescribed for you. Read the directions carefully, and ask your doctor or other care provider to review them with you.

## 2018-11-07 NOTE — NURSING NOTE
"Chief Complaint   Patient presents with     Repeat Pap Smear     last pap 18 NIL - HPV HR + - pap 16 NIL - HPV negative       Initial /86 (BP Location: Right arm, Patient Position: Chair, Cuff Size: Adult Regular)  Pulse 68  Wt 149 lb (67.6 kg)  LMP 2006  BMI 23.69 kg/m2 Estimated body mass index is 23.69 kg/(m^2) as calculated from the following:    Height as of 17: 5' 6.5\" (1.689 m).    Weight as of this encounter: 149 lb (67.6 kg).  BP completed using cuff size: regular    Questioned patient about current smoking habits.  Pt. has never smoked.          The following HM Due: dx pap smear    Nurse assisted visit.  Tammy Aaron MA.                 "
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
## 2018-11-13 LAB
COPATH REPORT: ABNORMAL
PAP: ABNORMAL

## 2018-11-30 ENCOUNTER — TELEPHONE (OUTPATIENT)
Dept: OBGYN | Facility: CLINIC | Age: 63
End: 2018-11-30

## 2018-11-30 NOTE — TELEPHONE ENCOUNTER
Please advise and assist      Procedure; in office (Les or Ian) LEEP      Dx; LGSIL with HR HPV      Surgeon; Sergio       No pre op needed  Advise to take 600 mg ibuprofen one hour before procedure

## 2018-12-12 DIAGNOSIS — G47.00 INSOMNIA, UNSPECIFIED TYPE: ICD-10-CM

## 2018-12-12 NOTE — TELEPHONE ENCOUNTER
Ambien 10 mg       Last Written Prescription Date:  3/14/17  Last Fill Quantity: 39,   # refills: 3  Last Office Visit: 11/7/18  Future Office visit:       Routing refill request to provider for review/approval because:  Drug not on the [facility], [facility] or [facility] refill protocol or controlled substance    Elida PARSON, RN     Pt no longer sees PCP at [facility] because of issues. Says Dr. Hill is like her family doctor. Adv that I would send to him but most likely will need another PCP to prescribe medication.

## 2018-12-12 NOTE — TELEPHONE ENCOUNTER
The pt is at the pharmacy right now at hoping to get this filled tonight. She is having trouble sleeping. Thank you.   Moon Jenkins on 12/12/2018 at 4:22 PM

## 2018-12-14 ENCOUNTER — OFFICE VISIT (OUTPATIENT)
Dept: OBGYN | Facility: CLINIC | Age: 63
End: 2018-12-14
Payer: COMMERCIAL

## 2018-12-14 VITALS — SYSTOLIC BLOOD PRESSURE: 132 MMHG | HEART RATE: 68 BPM | DIASTOLIC BLOOD PRESSURE: 92 MMHG

## 2018-12-14 DIAGNOSIS — R87.612 PAPANICOLAOU SMEAR OF CERVIX WITH LOW GRADE SQUAMOUS INTRAEPITHELIAL LESION (LGSIL): ICD-10-CM

## 2018-12-14 DIAGNOSIS — E03.8 OTHER SPECIFIED HYPOTHYROIDISM: Primary | ICD-10-CM

## 2018-12-14 PROCEDURE — 57460 BX OF CERVIX W/SCOPE LEEP: CPT | Performed by: OBSTETRICS & GYNECOLOGY

## 2018-12-14 PROCEDURE — 84443 ASSAY THYROID STIM HORMONE: CPT | Performed by: OBSTETRICS & GYNECOLOGY

## 2018-12-14 PROCEDURE — 88305 TISSUE EXAM BY PATHOLOGIST: CPT | Performed by: OBSTETRICS & GYNECOLOGY

## 2018-12-14 PROCEDURE — 00000155 ZZHCL STATISTIC H-CELL BLOCK W/STAIN: Performed by: OBSTETRICS & GYNECOLOGY

## 2018-12-14 PROCEDURE — 88307 TISSUE EXAM BY PATHOLOGIST: CPT | Performed by: OBSTETRICS & GYNECOLOGY

## 2018-12-14 PROCEDURE — 36415 COLL VENOUS BLD VENIPUNCTURE: CPT | Performed by: OBSTETRICS & GYNECOLOGY

## 2018-12-14 PROCEDURE — 88112 CYTOPATH CELL ENHANCE TECH: CPT | Performed by: OBSTETRICS & GYNECOLOGY

## 2018-12-14 RX ORDER — LUBIPROSTONE 24 UG/1
CAPSULE, GELATIN COATED ORAL
COMMUNITY
Start: 2018-11-26 | End: 2020-12-14

## 2018-12-14 RX ORDER — ZOLPIDEM TARTRATE 10 MG/1
10 TABLET ORAL
COMMUNITY
Start: 2012-03-20 | End: 2019-01-11

## 2018-12-14 RX ORDER — LINACLOTIDE 290 UG/1
CAPSULE, GELATIN COATED ORAL
COMMUNITY
Start: 2018-08-25

## 2018-12-14 RX ORDER — ZOLPIDEM TARTRATE 10 MG/1
10 TABLET ORAL
Qty: 39 TABLET | Refills: 3 | Status: SHIPPED | OUTPATIENT
Start: 2018-12-14

## 2018-12-14 NOTE — NURSING NOTE
"Chief Complaint   Patient presents with     Leep     colp 18 - pap 18 LSIL - HPV HR + - pt would like thyroid level checked - refill on ambien?       Initial BP (!) 132/92 (BP Location: Left arm, Patient Position: Chair, Cuff Size: Adult Regular)   Pulse 68   LMP 2006  Estimated body mass index is 23.69 kg/m  as calculated from the following:    Height as of 17: 1.689 m (5' 6.5\").    Weight as of 18: 67.6 kg (149 lb).  BP completed using cuff size: regular    Questioned patient about current smoking habits.  Pt. has never smoked.          The following HM Due: NONE        Nurse assisted visit.  Tammy Aaron MA.           "
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
## 2018-12-15 LAB — TSH SERPL DL<=0.005 MIU/L-ACNC: 3.69 MU/L (ref 0.4–4)

## 2018-12-17 LAB — COPATH REPORT: NORMAL

## 2018-12-18 LAB — COPATH REPORT: NORMAL

## 2019-01-11 ENCOUNTER — OFFICE VISIT (OUTPATIENT)
Dept: OBGYN | Facility: CLINIC | Age: 64
End: 2019-01-11
Payer: COMMERCIAL

## 2019-01-11 VITALS — HEART RATE: 80 BPM | SYSTOLIC BLOOD PRESSURE: 130 MMHG | DIASTOLIC BLOOD PRESSURE: 80 MMHG

## 2019-01-11 DIAGNOSIS — Z98.890 POSTOPERATIVE STATE: Primary | ICD-10-CM

## 2019-01-11 PROCEDURE — 99212 OFFICE O/P EST SF 10 MIN: CPT | Performed by: OBSTETRICS & GYNECOLOGY

## 2019-01-11 NOTE — PROGRESS NOTES
Here for postop check      -no gi or gu complaints        -pathology report reviewed; HGSIL, margins negative    O: cervix; re epithelializing    A: satisfactory postop progress    P; return in 3 months (first pap smear) then every 6 months for one year and yearly

## 2019-01-11 NOTE — NURSING NOTE
"Chief Complaint   Patient presents with     RECHECK     LEEP follow-up 18 - still having some light spotting       Initial /80 (BP Location: Left arm, Patient Position: Chair, Cuff Size: Adult Regular)   Pulse 80   LMP 2006  Estimated body mass index is 23.69 kg/m  as calculated from the following:    Height as of 17: 1.689 m (5' 6.5\").    Weight as of 18: 67.6 kg (149 lb).  BP completed using cuff size: regular    Questioned patient about current smoking habits.  Pt. has never smoked.          The following HM Due: NONE      Nurse assisted visit.  Tammy Aaron MA.               "

## 2019-03-22 ENCOUNTER — OFFICE VISIT (OUTPATIENT)
Dept: OBGYN | Facility: CLINIC | Age: 64
End: 2019-03-22
Payer: COMMERCIAL

## 2019-03-22 VITALS — DIASTOLIC BLOOD PRESSURE: 86 MMHG | SYSTOLIC BLOOD PRESSURE: 132 MMHG | HEART RATE: 84 BPM

## 2019-03-22 DIAGNOSIS — R87.612 PAPANICOLAOU SMEAR OF CERVIX WITH LOW GRADE SQUAMOUS INTRAEPITHELIAL LESION (LGSIL): Primary | ICD-10-CM

## 2019-03-22 PROCEDURE — 87624 HPV HI-RISK TYP POOLED RSLT: CPT | Performed by: OBSTETRICS & GYNECOLOGY

## 2019-03-22 PROCEDURE — 88175 CYTOPATH C/V AUTO FLUID REDO: CPT | Performed by: OBSTETRICS & GYNECOLOGY

## 2019-03-22 PROCEDURE — 99213 OFFICE O/P EST LOW 20 MIN: CPT | Performed by: OBSTETRICS & GYNECOLOGY

## 2019-03-22 RX ORDER — LEVOTHYROXINE SODIUM 88 UG/1
88 TABLET ORAL DAILY
COMMUNITY

## 2019-03-22 NOTE — PROGRESS NOTES
S:   Kenya Jones presents today for a repeat pap smear.  This the patient's first pap smear status   post LEEP .       O:   Abdomen:    Abdomen soft, non-tender. BS normal. No masses, organomegaly           Pelvic:  normal vagina and vulva, normal cervix without lesions or tenderness, uterus normal size anteverted, adenxa normal in size without tenderness, pap smear done, exam chaperoned by nurse         Pap smear with autocyte brush and reflex HPV typing done    A:   PAP SMEAR,   repeat after therapy    P:   The patient wll return in 6 months is this pap smear is normal,  or sooner as              needed.

## 2019-03-22 NOTE — NURSING NOTE
"Chief Complaint   Patient presents with     Repeat Pap Smear     LEEP 18       Initial /86 (BP Location: Left arm, Patient Position: Chair, Cuff Size: Adult Regular)   Pulse 84   LMP 2006  Estimated body mass index is 23.69 kg/m  as calculated from the following:    Height as of 17: 1.689 m (5' 6.5\").    Weight as of 18: 67.6 kg (149 lb).  BP completed using cuff size: regular    Questioned patient about current smoking habits.  Pt. has never smoked.          The following HM Due: pap smear        Tammy Aaron ma               "

## 2019-03-26 LAB
COPATH REPORT: NORMAL
PAP: NORMAL

## 2019-04-03 ENCOUNTER — TELEPHONE (OUTPATIENT)
Dept: OBGYN | Facility: CLINIC | Age: 64
End: 2019-04-03

## 2019-04-03 NOTE — TELEPHONE ENCOUNTER
Pt calls you back, she said you called her a couple times.      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      Otherwise feel free to leave a detailed message.  Pt looks forward to hearing from you.    Elinor MARTIN R.N.  [facility]

## 2019-04-05 ENCOUNTER — OFFICE VISIT (OUTPATIENT)
Dept: OBGYN | Facility: CLINIC | Age: 64
End: 2019-04-05
Payer: COMMERCIAL

## 2019-04-05 VITALS — DIASTOLIC BLOOD PRESSURE: 82 MMHG | SYSTOLIC BLOOD PRESSURE: 126 MMHG | HEART RATE: 84 BPM

## 2019-04-05 DIAGNOSIS — B97.7 HIGH RISK HPV INFECTION: ICD-10-CM

## 2019-04-05 DIAGNOSIS — M54.42 ACUTE BILATERAL LOW BACK PAIN WITH LEFT-SIDED SCIATICA: ICD-10-CM

## 2019-04-05 PROCEDURE — 00000155 ZZHCL STATISTIC H-CELL BLOCK W/STAIN: Performed by: OBSTETRICS & GYNECOLOGY

## 2019-04-05 PROCEDURE — 57452 EXAM OF CERVIX W/SCOPE: CPT | Performed by: OBSTETRICS & GYNECOLOGY

## 2019-04-05 PROCEDURE — 88305 TISSUE EXAM BY PATHOLOGIST: CPT | Performed by: OBSTETRICS & GYNECOLOGY

## 2019-04-05 PROCEDURE — 88112 CYTOPATH CELL ENHANCE TECH: CPT | Performed by: OBSTETRICS & GYNECOLOGY

## 2019-04-05 RX ORDER — CYCLOBENZAPRINE HCL 10 MG
10 TABLET ORAL 3 TIMES DAILY PRN
Qty: 90 TABLET | Refills: 8 | Status: SHIPPED | OUTPATIENT
Start: 2019-04-05 | End: 2020-12-14
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
## 2019-04-05 NOTE — NURSING NOTE
"Chief Complaint   Patient presents with     Colposcopy     19 NIL - HPV HR + - hx of colp 2018       Initial /82 (BP Location: Left arm, Patient Position: Chair, Cuff Size: Adult Regular)   Pulse 84   LMP 2006  Estimated body mass index is 23.69 kg/m  as calculated from the following:    Height as of 17: 1.689 m (5' 6.5\").    Weight as of 18: 67.6 kg (149 lb).  BP completed using cuff size: regular    Questioned patient about current smoking habits.  Pt. has never smoked.          The following HM Due: NONE      Nurse assisted visit.  Tammy Aaron MA.             "

## 2019-04-09 LAB — COPATH REPORT: NORMAL

## 2019-04-09 NOTE — TELEPHONE ENCOUNTER
Patient is calling today and would like to talk with Nora regarding her results from Dr. Hill. Please call on her cell phone today

## 2019-06-14 ENCOUNTER — TRANSFERRED RECORDS (OUTPATIENT)
Dept: HEALTH INFORMATION MANAGEMENT | Facility: CLINIC | Age: 64
End: 2019-06-14

## 2019-09-30 ENCOUNTER — HEALTH MAINTENANCE LETTER (OUTPATIENT)
Age: 64
End: 2019-09-30

## 2019-10-01 ENCOUNTER — HOSPITAL ENCOUNTER (OUTPATIENT)
Dept: MAMMOGRAPHY | Facility: CLINIC | Age: 64
Discharge: HOME | End: 2019-10-01
Attending: FAMILY MEDICINE | Admitting: FAMILY MEDICINE
Payer: COMMERCIAL

## 2019-10-01 DIAGNOSIS — Z12.31 VISIT FOR SCREENING MAMMOGRAM: ICD-10-CM

## 2019-10-01 PROCEDURE — 77063 BREAST TOMOSYNTHESIS BI: CPT

## 2019-11-05 ENCOUNTER — TRANSFERRED RECORDS (OUTPATIENT)
Dept: HEALTH INFORMATION MANAGEMENT | Facility: CLINIC | Age: 64
End: 2019-11-05

## 2019-11-05 LAB
HPV ABSTRACT: NORMAL
PAP SMEAR - HIM PATIENT REPORTED: POSITIVE

## 2020-07-08 ENCOUNTER — TRANSFERRED RECORDS (OUTPATIENT)
Dept: HEALTH INFORMATION MANAGEMENT | Facility: CLINIC | Age: 65
End: 2020-07-08

## 2020-10-14 ENCOUNTER — HOSPITAL ENCOUNTER (OUTPATIENT)
Dept: MAMMOGRAPHY | Facility: CLINIC | Age: 65
Discharge: HOME | End: 2020-10-14
Attending: OBSTETRICS & GYNECOLOGY | Admitting: OBSTETRICS & GYNECOLOGY
Payer: COMMERCIAL

## 2020-10-14 DIAGNOSIS — Z12.31 VISIT FOR SCREENING MAMMOGRAM: ICD-10-CM

## 2020-10-14 PROCEDURE — 77063 BREAST TOMOSYNTHESIS BI: CPT

## 2020-12-14 ENCOUNTER — OFFICE VISIT (OUTPATIENT)
Dept: INTERNAL MEDICINE | Facility: CLINIC | Age: 65
End: 2020-12-14
Payer: COMMERCIAL

## 2020-12-14 VITALS
DIASTOLIC BLOOD PRESSURE: 86 MMHG | TEMPERATURE: 97.6 F | HEART RATE: 87 BPM | SYSTOLIC BLOOD PRESSURE: 138 MMHG | OXYGEN SATURATION: 99 % | WEIGHT: 152.7 LBS | BODY MASS INDEX: 24.28 KG/M2

## 2020-12-14 DIAGNOSIS — H02.89 MOHS DEFECT OF EYELID: ICD-10-CM

## 2020-12-14 DIAGNOSIS — Z01.818 PREOP GENERAL PHYSICAL EXAM: Primary | ICD-10-CM

## 2020-12-14 PROCEDURE — 99203 OFFICE O/P NEW LOW 30 MIN: CPT | Performed by: NURSE PRACTITIONER

## 2020-12-14 PROCEDURE — 93000 ELECTROCARDIOGRAM COMPLETE: CPT | Performed by: NURSE PRACTITIONER

## 2020-12-14 SDOH — HEALTH STABILITY: MENTAL HEALTH: HOW OFTEN DO YOU HAVE 6 OR MORE DRINKS ON ONE OCCASION?: NEVER

## 2020-12-14 SDOH — HEALTH STABILITY: MENTAL HEALTH: HOW MANY STANDARD DRINKS CONTAINING ALCOHOL DO YOU HAVE ON A TYPICAL DAY?: 1 OR 2

## 2020-12-14 SDOH — HEALTH STABILITY: MENTAL HEALTH: HOW OFTEN DO YOU HAVE A DRINK CONTAINING ALCOHOL?: MONTHLY OR LESS

## 2020-12-14 NOTE — NURSING NOTE
Today's pre-op notes and EKG were faxed to Dr. Jose Munroe at [facility] [place] at [phone].
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
## 2020-12-14 NOTE — PATIENT INSTRUCTIONS
"Pre-op physical completed and cleared for surgery.    Take Levothyroxine with sip of water    EKG completed and ok    Preparing for Your Surgery  Getting started  A surgery nurse will call you to review your health history and instructions. They will give you an arrival time based on your scheduled surgery time.  Please be ready to share the following:    Your doctor's clinic name and phone number    Your medical, surgical and anesthesia history    A list of allergies and sensitivities    A list of medicines, including herbal treatments and over-the-counter drugs    Whether the patient has a legal guardian (ask how to send us the papers in advance)  If your child is having surgery, please ask for a copy of Preparing for Your Child's Surgery.    Preparing for surgery    Within 30 days of surgery: Have an exam at your family clinic (primary care clinic), or go to a pre-operative clinic. This exam is called a \"History and Physical,\" or H&P.    At your H&P exam, talk to your care team about all medicines you take. If you need to stop any medicines before surgery, ask when to start taking them again.  ? We do this for your safety. Many medicines can make you bleed too much during surgery. Some change how well surgery (anesthesia) drugs work.    Call your insurance company to see what it will and won't pay for. Ask if they need to pre-approve the surgery. (If no insurance, call [phone].)    Call your surgeon's clinic if there's any change in your health. This includes signs of a cold or flu (sore throat, runny nose, cough, rash, fever). It also includes a scrape or scratch near the surgery site.    If you have questions on the day of surgery, call your surgery center.  Eating and drinking guidelines  For your safety: Unless your surgeon tells you otherwise, follow the guidelines below.    Eat and drink as usual until 8 hours before surgery. After that, no food or milk.    Drink clear liquids until 2 hours before " surgery. These are liquids you can see through, like water, Gatorade and Propel Water. You may also have black coffee and tea (no cream or milk).    Nothing by mouth within 2 hours of surgery. This includes gum, candy and breath mints.    Stop alcohol the midnight before surgery.    If your family clinic tells you to take medicine on the morning of surgery, it's okay to take it with a sip of water.  Preventing infection    Shower or bathe the night before and morning of your surgery. Follow the instructions your clinic gave you. (If no instructions, use regular soap.)    Don't shave or clip hair near your surgery site. This can lead to skin infection.    Don't smoke the morning of surgery. Smoking increases the risk of infection. You may chew nicotine gum up to 2 hours before surgery. A nicotine patch is okay.  ? Note: Some surgeries require you to completely quit smoking and nicotine. Check with your surgeon.    Your care team will make every effort to keep you safe from infection. We will:  ? Clean our hands often with soap and water (or an alcohol-based hand rub).  ? Clean the skin at your surgery site with a special soap that kills germs. We'll also remove hair from the site as needed.  ? Wear special hair covers, masks, gowns and gloves during surgery.  ? Give antibiotic medicine, if prescribed. Not all surgeries need antibiotics.  What to bring on the day of surgery    Photo ID and insurance card    Copy of your health care directive, if you have one    Glasses and hearing aides (bring cases)  ? You can't wear contacts during surgery    Inhaler and eye drops, if you use them (tell us about these when you arrive)    CPAP machine or breathing device, if you use them    A few personal items, if spending the night    If you have . . .  ? A pacemaker or ICD (cardiac defibrillator): Bring the ID card.  ? An implanted stimulator: Bring the remote control.  ? A legal guardian: Bring a copy of the certified  (court-stamped) guardianship papers.  Please remove any jewelry, including body piercings. Leave jewelry and other valuables at home.  If you're going home the day of surgery  Important: If you don't follow the rules below, we must cancel your surgery.     Arrange for someone to drive you home after surgery. You may not drive, take a taxi or take public transportation by yourself (unless you'll have local anesthesia only).    Arrange for a responsible adult to stay with you overnight. If you don't, we may keep you in the hospital overnight, and you may need to pay the costs yourself.  Questions?   If you have any questions for your care team, list them here: _________________________________________________________________________________________________________________________________________________________________________________________________________________________________________________________________________________________________________________________  For informational purposes only. Not to replace the advice of your health care provider. Copyright   0787-0650 [employer]. All rights reserved. Clinically reviewed by Danni Stanton MD. [employer] [phone] - REV 07/19.

## 2020-12-30 ENCOUNTER — PATIENT OUTREACH (OUTPATIENT)
Dept: OBGYN | Facility: CLINIC | Age: 65
End: 2020-12-30

## 2020-12-30 DIAGNOSIS — R87.612 PAPANICOLAOU SMEAR OF CERVIX WITH LOW GRADE SQUAMOUS INTRAEPITHELIAL LESION (LGSIL): ICD-10-CM

## 2020-12-30 NOTE — TELEPHONE ENCOUNTER
12/30/20 Reminder call - pt notified. She states she had a pap smear done about 1 month ago with Dr. Marlene Mccain with [facility]. She thought we had these records. She will sign ANDREE when she is in next.

## 2021-01-15 ENCOUNTER — HEALTH MAINTENANCE LETTER (OUTPATIENT)
Age: 66
End: 2021-01-15

## 2021-03-01 ENCOUNTER — OFFICE VISIT (OUTPATIENT)
Dept: INTERNAL MEDICINE | Facility: CLINIC | Age: 66
End: 2021-03-01
Payer: COMMERCIAL

## 2021-03-01 VITALS
HEART RATE: 72 BPM | DIASTOLIC BLOOD PRESSURE: 74 MMHG | SYSTOLIC BLOOD PRESSURE: 118 MMHG | TEMPERATURE: 98.1 F | OXYGEN SATURATION: 95 % | BODY MASS INDEX: 25.44 KG/M2 | WEIGHT: 160 LBS

## 2021-03-01 DIAGNOSIS — Z01.818 PREOP GENERAL PHYSICAL EXAM: Primary | ICD-10-CM

## 2021-03-01 DIAGNOSIS — H02.403 DROOPY EYELID, BILATERAL: ICD-10-CM

## 2021-03-01 PROCEDURE — 99214 OFFICE O/P EST MOD 30 MIN: CPT | Performed by: NURSE PRACTITIONER

## 2021-03-01 PROCEDURE — 93000 ELECTROCARDIOGRAM COMPLETE: CPT | Performed by: NURSE PRACTITIONER
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
## 2021-03-01 NOTE — PATIENT INSTRUCTIONS
Take Levothyroxine morning of surgery with sip of water with remaining medications afterwards    Avoid NSAIDs 5-7 days prior to surgery    Covid test ordered; they should be calling on scheduling    Physical exam for pre-op approved for surgery  Preparing for Your Surgery  Getting started  A nurse will call you to review your health history and instructions. They will give you an arrival time based on your scheduled surgery time.  Please be ready to share the following:    Your doctor's clinic name and phone number    Your medical, surgical and anesthesia history    A list of allergies and sensitivities    A list of medicines, including herbal treatments and over-the-counter drugs    Whether the patient has a legal guardian (ask how to send us the papers in advance)  If you have a child who's having surgery, please ask for a copy of Preparing for Your Child's Surgery.    Preparing for surgery    Within 30 days of surgery: Have a pre-op exam (sometimes called an H&P, or History and Physical). This can be done at a clinic or pre-operative center.  ? If you're having a , you may not need this exam. Talk to your care team    At your pre-op exam, talk to your care team about all medicines you take. If you need to stop any medicines before surgery, ask when to start taking them again.  ? We do this for your safety. Many medicines can make you bleed too much during surgery. Some change how well surgery (anesthesia) drugs work.    Call your insurance company to let them know you're having surgery. (If you don't have insurance, call [phone].)    Call your clinic if there's any change in your health. This includes signs of a cold or flu (sore throat, runny nose, cough, rash, fever). It also includes a scrape or scratch near the surgery site.    If you have questions on the day of surgery, call your hospital or surgery center.  Eating and drinking guidelines  For your safety: Unless your surgeon tells you  otherwise, follow the guidelines below.    Eat and drink as usual until 8 hours before surgery. After that, no food or milk.    Drink clear liquids until 2 hours before surgery. These are liquids you can see through, like water, Gatorade and Propel Water. You may also have black coffee and tea (no cream or milk).    Nothing by mouth within 2 hours of surgery. This includes gum, candy and breath mints.    If you drink, stop drinking alcohol the night before surgery.    If your care team tells you to take medicine on the morning of surgery, it's okay to take it with a sip of water.  Preventing infection    Shower or bathe the night before and morning of your surgery. Follow the instructions your clinic gave you. (If no instructions, use regular soap.)    Don't shave or clip hair near your surgery site. We'll remove the hair if needed.    Don't smoke or vape the morning of surgery. You may chew nicotine gum up to 2 hours before surgery. A nicotine patch is okay.  ? Note: Some surgeries require you to completely quit smoking and nicotine. Check with your surgeon.    Your care team will make every effort to keep you safe from infection. We will:  ? Clean our hands often with soap and water (or an alcohol-based hand rub).  ? Clean the skin at your surgery site with a special soap that kills germs.  ? Give you a special gown to keep you warm. (Cold raises the risk of infection.)  ? Wear special hair covers, masks, gowns and gloves during surgery.  ? Give antibiotic medicine, if prescribed. Not all surgeries need antibiotics.  What to bring on the day of surgery    Photo ID and insurance card    Copy of your health care directive, if you have one    Glasses and hearing aides (bring cases)  ? You can't wear contacts during surgery    Inhaler and eye drops, if you use them (tell us about these when you arrive)    CPAP machine or breathing device, if you use them    A few personal items, if spending the night    If you have .  . .  ? A pacemaker or ICD (cardiac defibrillator): Bring the ID card.  ? An implanted stimulator: Bring the remote control.  ? A legal guardian: Bring a copy of the certified (court-stamped) guardianship papers.  Please remove any jewelry, including body piercings. Leave jewelry and other valuables at home.  If you're going home the day of surgery  Important: If you don't follow the rules below, we must cancel your surgery.     Arrange for someone to drive you home after surgery. You may not drive, take a taxi or take public transportation by yourself (unless you'll have local anesthesia only).    Arrange for a responsible adult to stay with you overnight. If you don't, we may keep you in the hospital overnight, and you may need to pay the costs yourself.  Questions?   If you have any questions for your care team, list them here: _________________________________________________________________________________________________________________________________________________________________________________________________________________________________________________________________________________________________________________________  For informational purposes only. Not to replace the advice of your health care provider. Copyright   2003, 2019 [facility]. All rights reserved. Clinically reviewed by Danni Stanton MD. [employer] [id number] - REV 4/20.

## 2021-03-02 NOTE — NURSING NOTE
Today's pre-op notes were faxed to Dr. Ganesh Parson at [facility] at [phone].

## 2021-03-09 DIAGNOSIS — Z01.818 PREOP GENERAL PHYSICAL EXAM: ICD-10-CM

## 2021-03-09 LAB
LABORATORY COMMENT REPORT: NORMAL
SARS-COV-2 RNA RESP QL NAA+PROBE: NEGATIVE
SARS-COV-2 RNA RESP QL NAA+PROBE: NORMAL
SPECIMEN SOURCE: NORMAL
SPECIMEN SOURCE: NORMAL

## 2021-03-09 PROCEDURE — U0005 INFEC AGEN DETEC AMPLI PROBE: HCPCS | Performed by: NURSE PRACTITIONER

## 2021-03-09 PROCEDURE — U0003 INFECTIOUS AGENT DETECTION BY NUCLEIC ACID (DNA OR RNA); SEVERE ACUTE RESPIRATORY SYNDROME CORONAVIRUS 2 (SARS-COV-2) (CORONAVIRUS DISEASE [COVID-19]), AMPLIFIED PROBE TECHNIQUE, MAKING USE OF HIGH THROUGHPUT TECHNOLOGIES AS DESCRIBED BY CMS-2020-01-R: HCPCS | Performed by: NURSE PRACTITIONER

## 2021-05-29 ENCOUNTER — RECORDS - HEALTHEAST (OUTPATIENT)
Dept: ADMINISTRATIVE | Facility: CLINIC | Age: 66
End: 2021-05-29

## 2021-06-30 NOTE — TELEPHONE ENCOUNTER
FYI to provider (sent to on-call provider as Dr. Hill is no longer with us) - Patient is lost to pap tracking follow-up. Attempts to contact pt have been made per reminder process and there has been no reply and/or no appt scheduled.        [facility]. No further action needed

## 2021-10-15 ENCOUNTER — HOSPITAL ENCOUNTER (OUTPATIENT)
Dept: MAMMOGRAPHY | Facility: CLINIC | Age: 66
Discharge: HOME | End: 2021-10-15
Attending: OBSTETRICS & GYNECOLOGY | Admitting: OBSTETRICS & GYNECOLOGY
Payer: COMMERCIAL

## 2021-10-15 DIAGNOSIS — Z12.31 VISIT FOR SCREENING MAMMOGRAM: ICD-10-CM

## 2021-10-15 PROCEDURE — 77063 BREAST TOMOSYNTHESIS BI: CPT

## 2021-10-24 ENCOUNTER — HEALTH MAINTENANCE LETTER (OUTPATIENT)
Age: 66
End: 2021-10-24

## 2021-11-03 ENCOUNTER — TRANSFERRED RECORDS (OUTPATIENT)
Dept: HEALTH INFORMATION MANAGEMENT | Facility: CLINIC | Age: 66
End: 2021-11-03
Payer: COMMERCIAL

## 2021-12-10 ENCOUNTER — OFFICE VISIT (OUTPATIENT)
Dept: URGENT CARE | Facility: URGENT CARE | Age: 66
End: 2021-12-10
Payer: COMMERCIAL

## 2021-12-10 VITALS
SYSTOLIC BLOOD PRESSURE: 158 MMHG | WEIGHT: 150 LBS | HEART RATE: 99 BPM | RESPIRATION RATE: 16 BRPM | BODY MASS INDEX: 23.85 KG/M2 | TEMPERATURE: 100.3 F | DIASTOLIC BLOOD PRESSURE: 95 MMHG | OXYGEN SATURATION: 98 %

## 2021-12-10 DIAGNOSIS — R05.9 COUGH: Primary | ICD-10-CM

## 2021-12-10 LAB
DEPRECATED S PYO AG THROAT QL EIA: NEGATIVE
FLUAV AG SPEC QL IA: NEGATIVE
FLUBV AG SPEC QL IA: NEGATIVE
GROUP A STREP BY PCR: NOT DETECTED

## 2021-12-10 PROCEDURE — U0005 INFEC AGEN DETEC AMPLI PROBE: HCPCS | Performed by: PHYSICIAN ASSISTANT

## 2021-12-10 PROCEDURE — U0003 INFECTIOUS AGENT DETECTION BY NUCLEIC ACID (DNA OR RNA); SEVERE ACUTE RESPIRATORY SYNDROME CORONAVIRUS 2 (SARS-COV-2) (CORONAVIRUS DISEASE [COVID-19]), AMPLIFIED PROBE TECHNIQUE, MAKING USE OF HIGH THROUGHPUT TECHNOLOGIES AS DESCRIBED BY CMS-2020-01-R: HCPCS | Performed by: PHYSICIAN ASSISTANT

## 2021-12-10 PROCEDURE — 99213 OFFICE O/P EST LOW 20 MIN: CPT | Performed by: PHYSICIAN ASSISTANT

## 2021-12-10 PROCEDURE — 87804 INFLUENZA ASSAY W/OPTIC: CPT | Performed by: PHYSICIAN ASSISTANT

## 2021-12-10 PROCEDURE — 87651 STREP A DNA AMP PROBE: CPT | Performed by: PHYSICIAN ASSISTANT

## 2021-12-10 RX ORDER — CODEINE PHOSPHATE AND GUAIFENESIN 10; 100 MG/5ML; MG/5ML
1-2 SOLUTION ORAL EVERY 4 HOURS PRN
Qty: 240 ML | Refills: 0 | Status: SHIPPED | OUTPATIENT
Start: 2021-12-10

## 2021-12-10 NOTE — PATIENT INSTRUCTIONS
Patient Education     Viral Upper Respiratory Illness (Adult)    You have a viral upper respiratory illness (URI), which is another term for the common cold. This illness is contagious during the first few days. It is spread through the air by coughing and sneezing. It may also be spread by direct contact (touching the sick person and then touching your own eyes, nose, or mouth). Frequent handwashing will decrease risk of spread. Most viral illnesses go away within 7 to 10 days with rest and simple home remedies. Sometimes the illness may last for several weeks. Antibiotics will not kill a virus, and they are generally not prescribed for this condition.  Home care    If symptoms are severe, rest at home for the first 2 to 3 days. When you resume activity, don't let yourself get too tired.    Don't smoke. If you need help stopping, talk with your healthcare provider.    Avoid being exposed to cigarette smoke (yours or others ).    You may use acetaminophen or ibuprofen to control pain and fever, unless another medicine was prescribed. If you have chronic liver or kidney disease, have ever had a stomach ulcer or gastrointestinal bleeding, or are taking blood-thinning medicines, talk with your healthcare provider before using these medicines. Aspirin should never be given to anyone under 18 years of age who is ill with a viral infection or fever. It may cause severe liver or brain damage.    Your appetite may be poor, so a light diet is fine. Stay well hydrated by drinking 6 to 8 glasses of fluids per day (water, soft drinks, juices, tea, or soup). Extra fluids will help loosen secretions in the nose and lungs.    Over-the-counter cold medicines will not shorten the length of time you re sick, but they may be helpful for the following symptoms: cough, sore throat, and nasal and sinus congestion. If you take prescription medicines, ask your healthcare provider or pharmacist which over-the-counter medicines are safe to  "use. (Note: Don't use decongestants if you have high blood pressure.)  Follow-up care  Follow up with your healthcare provider, or as advised.  When to seek medical advice  Call your healthcare provider right away if any of these occur:    Cough with lots of colored sputum (mucus)    Severe headache; face, neck, or ear pain    Difficulty swallowing due to throat pain    Fever of 100.4 F (38 C) or higher, or as directed by your healthcare provider  Call 911  Call 911 if any of these occur:    Chest pain, shortness of breath, wheezing, or difficulty breathing    Coughing up blood    Very severe pain with swallowing, especially if it goes along with a muffled voice   [employer] last reviewed this educational content on 6/1/2018 2000-2021 [employer]. All rights reserved. This information is not intended as a substitute for professional medical care. Always follow your healthcare professional's instructions.           Patient Education   After Your COVID-19 (Coronavirus) Test  You have been tested for COVID-19 (coronavirus).   If you'll have surgery in the next few days, we'll let you know ahead of time if you have the virus. Please call your surgeon's office with any questions.  For all other patients: Results are usually available in [facility] within 2 to 3 days.   If you do not have a [employer] account, you'll get a letter in the mail in about 7 to 10 days.   [employer] is often the fastest way to get test results. Please sign up if you do not already have a [employer] account. See the handout Getting COVID-19 Test Results in [employer] for help.  What if my test result is positive?  If your test is positive and you have not viewed your result in [employer], you'll get a phone call with your result. (A positive test means that you have the virus.)     Follow the tips under \"How do I self-isolate?\" below for 10 days (20 days if you have a weak immune system).    You don't need to be retested for COVID-19 before going " "back to school or work. As long as you're fever-free and feeling better, you can go back to school, work and other activities after waiting the 10 or 20 days.  What if I have questions after I get my results?  If you have questions about your results, please visit our testing website at [email or web address].   After 7 to 10 days, if you have not gotten your results:     Call [phone] ([phone]) and ask to speak with our COVID-19 results team.    If you're being treated at an infusion center: Call your infusion center directly.  What are the symptoms of COVID-19?  Cough, fever and trouble breathing are the most common signs of COVID-19.  Other symptoms can include new headaches, new muscle or body aches, new and unexplained fatigue (feeling very tired), chills, sore throat, congestion (stuffy or runny nose), diarrhea (loose poop), loss of taste or smell, belly pain, and nausea or vomiting (feeling sick to your stomach or throwing up).  You may already have symptoms of COVID-19, or they may show up later.  What should I do if I have symptoms?  If you're having surgery: Call your surgeon's office.  For all other patients: Stay home and away from others (self-isolate) until ...    You've had no fever--and no medicine that reduces fever--for 1 full day (24 hours), AND    Other symptoms have gotten better. For example, your cough or breathing has improved, AND    At least 10 days have passed since your symptoms first started.  How do I self-isolate?    Stay in your own room, even for meals. Use your own bathroom if you can.    Stay away from others in your home. No hugging, kissing or shaking hands. No visitors.    Don't go to work, school or anywhere else.    Clean \"high touch\" surfaces often (doorknobs, counters, handles). Use household cleaning spray or wipes. You'll find a full list of  on the EPA website: " www.epa.gov/pesticide-registration/list-n-disinfectants-use-against-sars-cov-2.    Cover your mouth and nose with a mask or other face covering to avoid spreading germs.    Wash your hands and face often. Use soap and water.    Caregivers in these groups are at risk for severe illness due to COVID-19:  ? People 65 years and older  ? People who live in a nursing home or long-term care facility  ? People with chronic disease (lung, heart, cancer, diabetes, kidney, liver, immunologic)  ? People who have a weakened immune system, including those who:    Are in cancer treatment    Take medicine that weakens the immune system, such as corticosteroids    Had a bone marrow or organ transplant    Have an immune deficiency    Have poorly controlled HIV or AIDS    Are obese (body mass index of 40 or higher)    Smoke regularly    Caregivers should wear gloves while washing dishes, handling laundry and cleaning bedrooms and bathrooms.    Use caution when washing and drying laundry: Don't shake dirty laundry and use the warmest water setting that you can.    For more tips on managing your health at home, go to www.cdc.gov/coronavirus/2019-ncov/downloads/10Things.pdf.  How can I take care of myself at home?  1. Get lots of rest. Drink extra fluids (unless a doctor has told you not to).  2. Take Tylenol (acetaminophen) for fever or pain. If you have liver or kidney problems, ask your family doctor if it's OK to take Tylenol.   Adults can take either:  ? 650 mg (two 325 mg pills) every 4 to 6 hours, or   ? 1,000 mg (two 500 mg pills) every 8 hours as needed.  ? Note: Don't take more than 3,000 mg in one day. Acetaminophen is found in many medicines (both prescribed and over-the-counter medicines). Read all labels to be sure you don't take too much.   For children, check the Tylenol bottle for the right dose. The dose is based on the child's age or weight.  3. If you have other health problems (like cancer, heart failure, an organ  transplant or severe kidney disease): Call your specialty clinic if you don't feel better in the next 2 days.  4. Know when to call 911. Emergency warning signs include:  ? Trouble breathing or shortness of breath  ? Chest pain or pressure that doesn't go away  ? Feeling confused like you haven't felt before, or not being able to wake up  ? Bluish-colored lips or face  5. If your doctor prescribed a blood thinner medicine: Follow their instructions.  Where can I get more information?    [facility] - About COVID-19:   [email or web address]    CDC - If You're Sick: cdc.gov/coronavirus/2019-ncov/about/steps-when-sick.html    CDC - Ending Home Isolation: www.cdc.gov/coronavirus/2019-ncov/hcp/disposition-in-home-patients.html    CDC - Caring for Someone: www.cdc.gov/coronavirus/2019-ncov/if-you-are-sick/care-for-someone.html    [facility] - Interim Guidance for Hospital Discharge to Home: www.[email or web address]./diseases/coronavirus/hcp/hospdischarge.pdf    [facility] clinical trials (COVID-19 research studies): [email or web address]-clinical-trials    Below are the COVID-19 hotlines at the [place] Department of [facility] ([facility]). Interpreters are available.  ? For health questions: Call 175-232-3888 or 1-714.532.6341 (7 a.m. to 7 p.m.)  ? For questions about schools and childcare: Call 908-611-3777 or 1-200.339.1566 (7 a.m. to 7 p.m.)    For informational purposes only. Not to replace the advice of your health care provider. Clinically reviewed by Infection Prevention and the [facility] COVID-19 Clinical Team. Copyright   2020 [employer]. All rights reserved. [employer] 722628 - Rev 11/11/20.

## 2021-12-10 NOTE — PROGRESS NOTES
Assessment & Plan     Cough  Acute problem.  Lungs are clear on exam.  No respiratory distress.  Influenza test is negative.  Guaifenesin with codeine is prescribed as needed for the cough.  Covid test is pending. Symptomatic Covid PCR swab done in clinic today.  Awaiting COVID-19 PCR result.  Advised self quarantine until test result return negative.  If result return positive will need to self quarantine for 10 days or until 24 hrs after symptoms resolve (whichever comes first).  Follow-up if any worsening symptoms.  She agrees with the plan.    - Symptomatic COVID-19 Virus (Coronavirus) by PCR Nose  - Streptococcus A Rapid Screen w/Reflex to PCR  - Influenza A/B antigen  - Group A Streptococcus PCR Throat Swab  - guaiFENesin-codeine (ROBITUSSIN AC) 100-10 MG/5ML solution  Dispense: 240 mL; Refill: 0         Return in about 1 week (around 12/17/2021) for Symptoms failing to improve.    Mackenzie Gambino PA-C  [facility] [place]    Maximino Zambrano is a 66 year old female who presents to clinic today for the following health issues:  Chief Complaint   Patient presents with     URI     few days now, back from [place], and running 102 fever (oral), congestion, runny nose, severe cough, dry painful cough, sleep deprived, body aches.      HPI      URI Adult    Onset of symptoms was 3-4 day(s) ago.  Course of illness is same.    Severity moderate  Current and Associated symptoms: cough - non-productive, body aches, nasal congestion, HA, fever max temp 102 F  Treatment measures tried include Tylenol/Ibuprofen, Mucinex, sudafed  Predisposing factors include recent travel.  Attended a conference in [place] last week end.   She is vaccinated and have received Covid booster .    Review of Systems  Constitutional, HEENT, cardiovascular, pulmonary, GI, , musculoskeletal, neuro, skin, endocrine and psych systems are negative, except as otherwise noted.      Objective    BP (!) 158/95   Pulse 99   Temp 100.3  F  (37.9  C) (Oral)   Resp 16   Wt 68 kg (150 lb)   LMP 06/23/2006   SpO2 98%   BMI 23.85 kg/m    Physical Exam   GENERAL: healthy, alert and no distress  HENT: ear canals and TM's normal,  mouth without ulcers or lesions  RESP: lungs clear to auscultation - no rales, rhonchi or wheezes  CV: regular rate and rhythm, normal S1 S2  MS: no gross musculoskeletal defects noted, no edema  SKIN: no suspicious lesions or rashes    Results for orders placed or performed in visit on 12/10/21 (from the past 24 hour(s))   Streptococcus A Rapid Screen w/Reflex to PCR    Specimen: Throat; Swab   Result Value Ref Range    Group A Strep antigen Negative Negative   Influenza A/B antigen    Specimen: Nose; Swab   Result Value Ref Range    Influenza A antigen Negative Negative    Influenza B antigen Negative Negative    Narrative    Test results must be correlated with clinical data. If necessary, results should be confirmed by a molecular assay or viral culture.

## 2021-12-11 LAB — SARS-COV-2 RNA RESP QL NAA+PROBE: NEGATIVE

## 2022-02-13 ENCOUNTER — HEALTH MAINTENANCE LETTER (OUTPATIENT)
Age: 67
End: 2022-02-13

## 2022-04-25 ENCOUNTER — ANCILLARY PROCEDURE (OUTPATIENT)
Dept: BONE DENSITY | Facility: CLINIC | Age: 67
End: 2022-04-25
Attending: OBSTETRICS & GYNECOLOGY
Payer: COMMERCIAL

## 2022-04-25 DIAGNOSIS — Z78.0 POSTMENOPAUSE: ICD-10-CM

## 2022-04-25 PROCEDURE — 77080 DXA BONE DENSITY AXIAL: CPT | Performed by: INTERNAL MEDICINE

## 2022-05-12 DIAGNOSIS — L65.9 ALOPECIA: Primary | ICD-10-CM

## 2022-05-18 ENCOUNTER — LAB (OUTPATIENT)
Dept: LAB | Facility: CLINIC | Age: 67
End: 2022-05-18
Payer: COMMERCIAL

## 2022-05-18 DIAGNOSIS — L65.9 ALOPECIA: ICD-10-CM

## 2022-05-18 LAB
BASOPHILS # BLD AUTO: 0 10E3/UL (ref 0–0.2)
BASOPHILS NFR BLD AUTO: 1 %
EOSINOPHIL # BLD AUTO: 0.2 10E3/UL (ref 0–0.7)
EOSINOPHIL NFR BLD AUTO: 3 %
ERYTHROCYTE [DISTWIDTH] IN BLOOD BY AUTOMATED COUNT: 13.7 % (ref 10–15)
HCT VFR BLD AUTO: 38.3 % (ref 35–47)
HGB BLD-MCNC: 12.8 G/DL (ref 11.7–15.7)
IMM GRANULOCYTES # BLD: 0 10E3/UL
IMM GRANULOCYTES NFR BLD: 0 %
LYMPHOCYTES # BLD AUTO: 2.2 10E3/UL (ref 0.8–5.3)
LYMPHOCYTES NFR BLD AUTO: 35 %
MCH RBC QN AUTO: 29.3 PG (ref 26.5–33)
MCHC RBC AUTO-ENTMCNC: 33.4 G/DL (ref 31.5–36.5)
MCV RBC AUTO: 88 FL (ref 78–100)
MONOCYTES # BLD AUTO: 0.5 10E3/UL (ref 0–1.3)
MONOCYTES NFR BLD AUTO: 9 %
NEUTROPHILS # BLD AUTO: 3.3 10E3/UL (ref 1.6–8.3)
NEUTROPHILS NFR BLD AUTO: 53 %
PLATELET # BLD AUTO: 262 10E3/UL (ref 150–450)
RBC # BLD AUTO: 4.37 10E6/UL (ref 3.8–5.2)
WBC # BLD AUTO: 6.2 10E3/UL (ref 4–11)

## 2022-05-18 PROCEDURE — 36415 COLL VENOUS BLD VENIPUNCTURE: CPT

## 2022-05-18 PROCEDURE — 85025 COMPLETE CBC W/AUTO DIFF WBC: CPT

## 2022-05-18 PROCEDURE — 86780 TREPONEMA PALLIDUM: CPT

## 2022-05-18 PROCEDURE — 84403 ASSAY OF TOTAL TESTOSTERONE: CPT

## 2022-05-18 PROCEDURE — 84146 ASSAY OF PROLACTIN: CPT

## 2022-05-18 PROCEDURE — 83550 IRON BINDING TEST: CPT

## 2022-05-18 PROCEDURE — 99000 SPECIMEN HANDLING OFFICE-LAB: CPT

## 2022-05-18 PROCEDURE — 84443 ASSAY THYROID STIM HORMONE: CPT

## 2022-05-18 PROCEDURE — 82728 ASSAY OF FERRITIN: CPT

## 2022-05-18 PROCEDURE — 82157 ASSAY OF ANDROSTENEDIONE: CPT | Mod: 90

## 2022-05-18 PROCEDURE — 84439 ASSAY OF FREE THYROXINE: CPT

## 2022-05-19 LAB
PROLACTIN SERPL-MCNC: 4 UG/L (ref 3–27)
T PALLIDUM AB SER QL: NONREACTIVE

## 2022-05-20 LAB
FERRITIN SERPL-MCNC: 59 NG/ML (ref 8–252)
IRON SATN MFR SERPL: 18 % (ref 15–46)
IRON SERPL-MCNC: 66 UG/DL (ref 35–180)
T4 FREE SERPL-MCNC: 1.1 NG/DL (ref 0.76–1.46)
TESTOST SERPL-MCNC: 22 NG/DL (ref 8–60)
TIBC SERPL-MCNC: 363 UG/DL (ref 240–430)
TSH SERPL DL<=0.005 MIU/L-ACNC: 1.28 MU/L (ref 0.4–4)

## 2022-05-30 LAB — ANDROST SERPL-MCNC: 0.71 NG/ML

## 2022-07-31 ENCOUNTER — HEALTH MAINTENANCE LETTER (OUTPATIENT)
Age: 67
End: 2022-07-31

## 2022-10-14 ENCOUNTER — TRANSFERRED RECORDS (OUTPATIENT)
Dept: HEALTH INFORMATION MANAGEMENT | Facility: CLINIC | Age: 67
End: 2022-10-14

## 2022-10-15 ENCOUNTER — HEALTH MAINTENANCE LETTER (OUTPATIENT)
Age: 67
End: 2022-10-15

## 2022-10-26 ENCOUNTER — HOSPITAL ENCOUNTER (OUTPATIENT)
Dept: MAMMOGRAPHY | Facility: CLINIC | Age: 67
Discharge: HOME | End: 2022-10-26
Attending: OBSTETRICS & GYNECOLOGY | Admitting: OBSTETRICS & GYNECOLOGY
Payer: MEDICARE

## 2022-10-26 DIAGNOSIS — Z12.31 VISIT FOR SCREENING MAMMOGRAM: ICD-10-CM

## 2022-10-26 PROCEDURE — 77067 SCR MAMMO BI INCL CAD: CPT

## 2023-02-24 ENCOUNTER — TRANSFERRED RECORDS (OUTPATIENT)
Dept: HEALTH INFORMATION MANAGEMENT | Facility: CLINIC | Age: 68
End: 2023-02-24
Payer: COMMERCIAL

## 2023-03-21 ENCOUNTER — TRANSFERRED RECORDS (OUTPATIENT)
Dept: HEALTH INFORMATION MANAGEMENT | Facility: CLINIC | Age: 68
End: 2023-03-21
Payer: COMMERCIAL

## 2023-06-01 ENCOUNTER — HEALTH MAINTENANCE LETTER (OUTPATIENT)
Age: 68
End: 2023-06-01

## 2023-06-30 ENCOUNTER — TRANSFERRED RECORDS (OUTPATIENT)
Dept: HEALTH INFORMATION MANAGEMENT | Facility: CLINIC | Age: 68
End: 2023-06-30
Payer: COMMERCIAL

## 2023-07-11 ENCOUNTER — TRANSFERRED RECORDS (OUTPATIENT)
Dept: HEALTH INFORMATION MANAGEMENT | Facility: CLINIC | Age: 68
End: 2023-07-11
Payer: COMMERCIAL

## 2023-07-27 ENCOUNTER — TRANSFERRED RECORDS (OUTPATIENT)
Dept: HEALTH INFORMATION MANAGEMENT | Facility: CLINIC | Age: 68
End: 2023-07-27
Payer: COMMERCIAL

## 2023-08-23 ENCOUNTER — TRANSFERRED RECORDS (OUTPATIENT)
Dept: HEALTH INFORMATION MANAGEMENT | Facility: CLINIC | Age: 68
End: 2023-08-23
Payer: COMMERCIAL

## 2023-11-07 ENCOUNTER — HOSPITAL ENCOUNTER (OUTPATIENT)
Dept: MAMMOGRAPHY | Facility: CLINIC | Age: 68
Discharge: HOME | End: 2023-11-07
Attending: OBSTETRICS & GYNECOLOGY | Admitting: OBSTETRICS & GYNECOLOGY
Payer: MEDICARE

## 2023-11-07 DIAGNOSIS — Z12.31 VISIT FOR SCREENING MAMMOGRAM: ICD-10-CM

## 2023-11-07 PROCEDURE — 77067 SCR MAMMO BI INCL CAD: CPT

## 2024-01-24 ENCOUNTER — TRANSFERRED RECORDS (OUTPATIENT)
Dept: HEALTH INFORMATION MANAGEMENT | Facility: CLINIC | Age: 69
End: 2024-01-24
Payer: MEDICARE

## 2024-03-05 ENCOUNTER — TRANSFERRED RECORDS (OUTPATIENT)
Dept: HEALTH INFORMATION MANAGEMENT | Facility: CLINIC | Age: 69
End: 2024-03-05

## 2024-03-14 ENCOUNTER — TRANSFERRED RECORDS (OUTPATIENT)
Dept: HEALTH INFORMATION MANAGEMENT | Facility: CLINIC | Age: 69
End: 2024-03-14
Payer: MEDICARE

## 2024-03-19 ENCOUNTER — TRANSFERRED RECORDS (OUTPATIENT)
Dept: HEALTH INFORMATION MANAGEMENT | Facility: CLINIC | Age: 69
End: 2024-03-19
Payer: MEDICARE

## 2024-05-28 ENCOUNTER — TELEPHONE (OUTPATIENT)
Dept: ORTHOPEDICS | Facility: CLINIC | Age: 69
End: 2024-05-28
Payer: MEDICARE

## 2024-05-28 NOTE — TELEPHONE ENCOUNTER
Please advise if you are able to see the patient or who you would recommend she see.     KOBE Shah RN

## 2024-05-28 NOTE — TELEPHONE ENCOUNTER
Other: pt called stated that her provider Dr Garcia suggested Dr. Lee who could be a recommendation to pt for L hamstring revision. Pt would like to schedule w/ Dr. Lee, but per grid, provider doesn't see for this. Pt's DOS 03/05/2023 at [facility], pt had mri's done again and img showed that surgery was not affective. Pt would like for care team to reach or if provider can recommend a specific provider who can see for hamstring revision.       Could we send this information to you in [employer] or would you prefer to receive a phone call?:   Patient would prefer a phone call   Okay to leave a detailed message?: Yes at Cell number on file:    Telephone Information:   Mobile [phone]

## 2024-06-03 ENCOUNTER — TELEPHONE (OUTPATIENT)
Dept: ORTHOPEDICS | Facility: CLINIC | Age: 69
End: 2024-06-03

## 2024-06-03 ENCOUNTER — OFFICE VISIT (OUTPATIENT)
Dept: ORTHOPEDICS | Facility: CLINIC | Age: 69
End: 2024-06-03
Payer: MEDICARE

## 2024-06-03 DIAGNOSIS — S76.312D COMPLETE RUPTURE OF LEFT PROXIMAL HAMSTRING TENDON, SUBSEQUENT ENCOUNTER: Primary | ICD-10-CM

## 2024-06-03 PROCEDURE — 99203 OFFICE O/P NEW LOW 30 MIN: CPT | Performed by: STUDENT IN AN ORGANIZED HEALTH CARE EDUCATION/TRAINING PROGRAM
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
## 2024-06-04 ENCOUNTER — TRANSFERRED RECORDS (OUTPATIENT)
Dept: HEALTH INFORMATION MANAGEMENT | Facility: CLINIC | Age: 69
End: 2024-06-04
Payer: MEDICARE
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
## 2024-06-20 NOTE — TELEPHONE ENCOUNTER
Operative report received via fax from [facility]. Document sent to scan and copy placed in provider basket in  for review. Patient was last seen 6/3/24. No formal follow up scheduled.     Anna Marie Roland MSA, ATC  Certified Athletic Trainer

## 2024-07-16 ENCOUNTER — TRANSFERRED RECORDS (OUTPATIENT)
Dept: HEALTH INFORMATION MANAGEMENT | Facility: CLINIC | Age: 69
End: 2024-07-16
Payer: MEDICARE

## 2024-08-04 ENCOUNTER — HEALTH MAINTENANCE LETTER (OUTPATIENT)
Age: 69
End: 2024-08-04

## 2024-09-17 ENCOUNTER — TRANSFERRED RECORDS (OUTPATIENT)
Dept: HEALTH INFORMATION MANAGEMENT | Facility: CLINIC | Age: 69
End: 2024-09-17
Payer: MEDICARE

## 2024-11-08 ENCOUNTER — HOSPITAL ENCOUNTER (OUTPATIENT)
Dept: MAMMOGRAPHY | Facility: CLINIC | Age: 69
Discharge: HOME | End: 2024-11-08
Attending: OBSTETRICS & GYNECOLOGY | Admitting: OBSTETRICS & GYNECOLOGY
Payer: MEDICARE

## 2024-11-08 DIAGNOSIS — Z12.31 VISIT FOR SCREENING MAMMOGRAM: ICD-10-CM

## 2024-11-08 PROCEDURE — 77063 BREAST TOMOSYNTHESIS BI: CPT

## 2024-11-21 ENCOUNTER — TRANSFERRED RECORDS (OUTPATIENT)
Dept: HEALTH INFORMATION MANAGEMENT | Facility: CLINIC | Age: 69
End: 2024-11-21
Payer: MEDICARE

## 2024-12-20 ENCOUNTER — TRANSFERRED RECORDS (OUTPATIENT)
Dept: HEALTH INFORMATION MANAGEMENT | Facility: CLINIC | Age: 69
End: 2024-12-20
Payer: MEDICARE

## 2025-01-15 LAB — PHQ9 SCORE: 2

## 2025-03-21 ENCOUNTER — TRANSFERRED RECORDS (OUTPATIENT)
Dept: HEALTH INFORMATION MANAGEMENT | Facility: CLINIC | Age: 70
End: 2025-03-21
Payer: MEDICARE

## 2025-05-05 ENCOUNTER — ANCILLARY PROCEDURE (OUTPATIENT)
Dept: BONE DENSITY | Facility: CLINIC | Age: 70
End: 2025-05-05
Attending: NURSE PRACTITIONER
Payer: MEDICARE

## 2025-05-05 DIAGNOSIS — M81.0 AGE-RELATED OSTEOPOROSIS WITHOUT CURRENT PATHOLOGICAL FRACTURE: ICD-10-CM

## 2025-05-05 PROCEDURE — 77080 DXA BONE DENSITY AXIAL: CPT | Mod: TC | Performed by: PHYSICIAN ASSISTANT

## 2025-08-16 ENCOUNTER — HEALTH MAINTENANCE LETTER (OUTPATIENT)
Age: 70
End: 2025-08-16